# Patient Record
Sex: FEMALE | Race: WHITE | NOT HISPANIC OR LATINO | URBAN - METROPOLITAN AREA
[De-identification: names, ages, dates, MRNs, and addresses within clinical notes are randomized per-mention and may not be internally consistent; named-entity substitution may affect disease eponyms.]

---

## 2018-07-10 ENCOUNTER — OFFICE (OUTPATIENT)
Dept: URBAN - METROPOLITAN AREA CLINIC 75 | Facility: CLINIC | Age: 43
End: 2018-07-10
Payer: COMMERCIAL

## 2018-07-10 VITALS
WEIGHT: 267 LBS | HEART RATE: 107 BPM | HEIGHT: 64 IN | DIASTOLIC BLOOD PRESSURE: 80 MMHG | SYSTOLIC BLOOD PRESSURE: 136 MMHG

## 2018-07-10 VITALS
OXYGEN SATURATION: 99 % | HEART RATE: 82 BPM | SYSTOLIC BLOOD PRESSURE: 148 MMHG | HEART RATE: 98 BPM | HEART RATE: 86 BPM | TEMPERATURE: 98.6 F | SYSTOLIC BLOOD PRESSURE: 153 MMHG | DIASTOLIC BLOOD PRESSURE: 68 MMHG | HEART RATE: 77 BPM | SYSTOLIC BLOOD PRESSURE: 129 MMHG | RESPIRATION RATE: 16 BRPM | HEART RATE: 87 BPM | WEIGHT: 267 LBS | DIASTOLIC BLOOD PRESSURE: 62 MMHG | SYSTOLIC BLOOD PRESSURE: 155 MMHG | OXYGEN SATURATION: 100 % | RESPIRATION RATE: 14 BRPM | DIASTOLIC BLOOD PRESSURE: 81 MMHG | DIASTOLIC BLOOD PRESSURE: 95 MMHG | HEART RATE: 95 BPM | OXYGEN SATURATION: 96 % | RESPIRATION RATE: 18 BRPM | SYSTOLIC BLOOD PRESSURE: 144 MMHG | HEART RATE: 93 BPM | RESPIRATION RATE: 13 BRPM | DIASTOLIC BLOOD PRESSURE: 79 MMHG | SYSTOLIC BLOOD PRESSURE: 137 MMHG | HEART RATE: 96 BPM | HEIGHT: 64 IN | DIASTOLIC BLOOD PRESSURE: 80 MMHG

## 2018-07-10 DIAGNOSIS — Z87.11 PERSONAL HISTORY OF PEPTIC ULCER DISEASE: ICD-10-CM

## 2018-07-10 DIAGNOSIS — K59.00 CONSTIPATION, UNSPECIFIED: ICD-10-CM

## 2018-07-10 DIAGNOSIS — R10.13 EPIGASTRIC PAIN: ICD-10-CM

## 2018-07-10 DIAGNOSIS — R85.5 ABNORMAL MICROBIOLOGICAL FINDINGS IN SPECIMENS FROM DIGESTIV: ICD-10-CM

## 2018-07-10 DIAGNOSIS — K92.1 MELENA: ICD-10-CM

## 2018-07-10 DIAGNOSIS — Z86.010 PERSONAL HISTORY OF COLONIC POLYPS: ICD-10-CM

## 2018-07-10 PROCEDURE — 99205 OFFICE O/P NEW HI 60 MIN: CPT | Performed by: INTERNAL MEDICINE

## 2018-07-10 RX ORDER — LUBIPROSTONE 24 UG/1
48 CAPSULE, GELATIN COATED ORAL
Qty: 60 | Refills: 2 | Status: ACTIVE
Start: 2018-07-10

## 2018-07-10 RX ORDER — PANTOPRAZOLE SODIUM 40 MG/1
80 TABLET, DELAYED RELEASE ORAL
Qty: 60 | Refills: 2 | Status: ACTIVE

## 2018-07-13 ENCOUNTER — AMBULATORY SURGICAL CENTER (OUTPATIENT)
Dept: URBAN - METROPOLITAN AREA SURGERY 17 | Facility: SURGERY | Age: 43
End: 2018-07-13
Payer: COMMERCIAL

## 2018-07-13 ENCOUNTER — OFFICE (OUTPATIENT)
Dept: URBAN - METROPOLITAN AREA PATHOLOGY 4 | Facility: PATHOLOGY | Age: 43
End: 2018-07-13
Payer: COMMERCIAL

## 2018-07-13 DIAGNOSIS — K31.89 OTHER DISEASES OF STOMACH AND DUODENUM: ICD-10-CM

## 2018-07-13 DIAGNOSIS — K29.70 GASTRITIS, UNSPECIFIED, WITHOUT BLEEDING: ICD-10-CM

## 2018-07-13 DIAGNOSIS — K29.80 DUODENITIS WITHOUT BLEEDING: ICD-10-CM

## 2018-07-13 DIAGNOSIS — R10.13 EPIGASTRIC PAIN: ICD-10-CM

## 2018-07-13 DIAGNOSIS — R85.5 ABNORMAL MICROBIOLOGICAL FINDINGS IN SPECIMENS FROM DIGESTIV: ICD-10-CM

## 2018-07-13 DIAGNOSIS — R93.3 ABNORMAL FINDINGS ON DIAGNOSTIC IMAGING OF OTHER PARTS OF DI: ICD-10-CM

## 2018-07-13 DIAGNOSIS — T18.128A FOOD IN ESOPHAGUS CAUSING OTHER INJURY, INITIAL ENCOUNTER: ICD-10-CM

## 2018-07-13 DIAGNOSIS — Z87.11 PERSONAL HISTORY OF PEPTIC ULCER DISEASE: ICD-10-CM

## 2018-07-13 LAB
GI HISTOLOGY: A. UNSPECIFIED: (no result)
GI HISTOLOGY: B. SELECT: (no result)
GI HISTOLOGY: PDF REPORT: (no result)

## 2018-07-13 PROCEDURE — 43239 EGD BIOPSY SINGLE/MULTIPLE: CPT | Performed by: INTERNAL MEDICINE

## 2018-07-13 PROCEDURE — 88305 TISSUE EXAM BY PATHOLOGIST: CPT | Performed by: INTERNAL MEDICINE

## 2018-07-13 PROCEDURE — 88342 IMHCHEM/IMCYTCHM 1ST ANTB: CPT | Performed by: INTERNAL MEDICINE

## 2018-07-13 RX ADMIN — PROPOFOL 100 MG: 10 INJECTION, EMULSION INTRAVENOUS at 07:45

## 2018-07-13 RX ADMIN — PROPOFOL 50 MG: 10 INJECTION, EMULSION INTRAVENOUS at 07:50

## 2018-07-13 RX ADMIN — PROPOFOL 50 MG: 10 INJECTION, EMULSION INTRAVENOUS at 07:46

## 2018-08-17 PROBLEM — T18.2XXA FOREIGN BODY IN STOMACH, INITIAL ENCOUNTER: Status: ACTIVE | Noted: 2018-07-13

## 2020-02-09 ENCOUNTER — HOSPITAL ENCOUNTER (EMERGENCY)
Facility: HOSPITAL | Age: 45
Discharge: PSYCHIATRIC OR SUBSTANCE USE FACILITY | End: 2020-02-10
Attending: EMERGENCY MEDICINE | Admitting: EMERGENCY MEDICINE

## 2020-02-09 DIAGNOSIS — F33.1 MODERATE EPISODE OF RECURRENT MAJOR DEPRESSIVE DISORDER (HCC): Primary | ICD-10-CM

## 2020-02-09 DIAGNOSIS — R45.851 SUICIDAL THOUGHTS: ICD-10-CM

## 2020-02-09 LAB
AMPHET+METHAMPHET UR QL: NEGATIVE
BARBITURATES UR QL SCN: NEGATIVE
BENZODIAZ UR QL SCN: POSITIVE
CANNABINOIDS SERPL QL: NEGATIVE
COCAINE UR QL: NEGATIVE
ETHANOL BLD-MCNC: <10 MG/DL (ref 0–10)
ETHANOL UR QL: <0.01 %
METHADONE UR QL SCN: NEGATIVE
OPIATES UR QL: NEGATIVE
OXYCODONE UR QL SCN: NEGATIVE

## 2020-02-09 PROCEDURE — 80307 DRUG TEST PRSMV CHEM ANLYZR: CPT

## 2020-02-09 PROCEDURE — 99284 EMERGENCY DEPT VISIT MOD MDM: CPT

## 2020-02-09 RX ORDER — FAMOTIDINE 40 MG/1
40 TABLET, FILM COATED ORAL NIGHTLY
COMMUNITY

## 2020-02-09 RX ORDER — HYDROCODONE BITARTRATE AND ACETAMINOPHEN 7.5; 325 MG/1; MG/1
1 TABLET ORAL EVERY 8 HOURS PRN
COMMUNITY
End: 2020-02-13 | Stop reason: HOSPADM

## 2020-02-09 RX ORDER — ALPRAZOLAM 0.5 MG/1
0.5 TABLET ORAL 3 TIMES DAILY PRN
COMMUNITY
End: 2020-02-13 | Stop reason: HOSPADM

## 2020-02-09 RX ORDER — BACLOFEN 10 MG/1
10 TABLET ORAL 3 TIMES DAILY
Status: ON HOLD | COMMUNITY
End: 2020-02-13 | Stop reason: SDUPTHER

## 2020-02-09 RX ORDER — LISINOPRIL AND HYDROCHLOROTHIAZIDE 25; 20 MG/1; MG/1
1 TABLET ORAL DAILY
Status: ON HOLD | COMMUNITY
End: 2020-02-13 | Stop reason: SDUPTHER

## 2020-02-09 RX ORDER — FLUOXETINE HYDROCHLORIDE 60 MG/1
60 TABLET, FILM COATED ORAL; ORAL DAILY
COMMUNITY
End: 2020-02-13 | Stop reason: HOSPADM

## 2020-02-10 ENCOUNTER — HOSPITAL ENCOUNTER (INPATIENT)
Facility: HOSPITAL | Age: 45
LOS: 3 days | Discharge: HOME | End: 2020-02-13
Attending: SPECIALIST | Admitting: SPECIALIST

## 2020-02-10 VITALS
SYSTOLIC BLOOD PRESSURE: 144 MMHG | DIASTOLIC BLOOD PRESSURE: 82 MMHG | RESPIRATION RATE: 16 BRPM | OXYGEN SATURATION: 98 % | WEIGHT: 252.6 LBS | HEIGHT: 64 IN | BODY MASS INDEX: 43.13 KG/M2 | HEART RATE: 74 BPM | TEMPERATURE: 98.7 F

## 2020-02-10 PROBLEM — R45.851 SUICIDAL IDEATION: Status: ACTIVE | Noted: 2020-02-10

## 2020-02-10 PROBLEM — G89.29 CHRONIC BACK PAIN: Status: ACTIVE | Noted: 2020-02-10

## 2020-02-10 PROBLEM — C53.9 CERVICAL CANCER (HCC): Status: ACTIVE | Noted: 2020-02-10

## 2020-02-10 PROBLEM — F32.A ANXIETY AND DEPRESSION: Status: ACTIVE | Noted: 2020-02-10

## 2020-02-10 PROBLEM — M54.9 CHRONIC BACK PAIN: Status: ACTIVE | Noted: 2020-02-10

## 2020-02-10 PROBLEM — K21.9 GERD WITHOUT ESOPHAGITIS: Status: ACTIVE | Noted: 2020-02-10

## 2020-02-10 PROBLEM — F41.9 ANXIETY AND DEPRESSION: Status: ACTIVE | Noted: 2020-02-10

## 2020-02-10 PROCEDURE — 90791 PSYCH DIAGNOSTIC EVALUATION: CPT

## 2020-02-10 RX ORDER — PRAZOSIN HYDROCHLORIDE 2 MG/1
4 CAPSULE ORAL NIGHTLY
Status: DISCONTINUED | OUTPATIENT
Start: 2020-02-10 | End: 2020-02-13 | Stop reason: HOSPADM

## 2020-02-10 RX ORDER — ALUMINA, MAGNESIA, AND SIMETHICONE 2400; 2400; 240 MG/30ML; MG/30ML; MG/30ML
15 SUSPENSION ORAL EVERY 6 HOURS PRN
Status: DISCONTINUED | OUTPATIENT
Start: 2020-02-10 | End: 2020-02-13 | Stop reason: HOSPADM

## 2020-02-10 RX ORDER — FLUOXETINE HYDROCHLORIDE 20 MG/1
60 CAPSULE ORAL DAILY
Status: DISCONTINUED | OUTPATIENT
Start: 2020-02-10 | End: 2020-02-11

## 2020-02-10 RX ORDER — ACETAMINOPHEN 325 MG/1
650 TABLET ORAL EVERY 4 HOURS PRN
Status: DISCONTINUED | OUTPATIENT
Start: 2020-02-10 | End: 2020-02-13 | Stop reason: HOSPADM

## 2020-02-10 RX ORDER — ALPRAZOLAM 0.5 MG/1
0.5 TABLET ORAL 2 TIMES DAILY
Status: DISCONTINUED | OUTPATIENT
Start: 2020-02-10 | End: 2020-02-13 | Stop reason: HOSPADM

## 2020-02-10 RX ORDER — BACLOFEN 10 MG/1
10 TABLET ORAL 3 TIMES DAILY
Status: DISCONTINUED | OUTPATIENT
Start: 2020-02-10 | End: 2020-02-13 | Stop reason: HOSPADM

## 2020-02-10 RX ORDER — FAMOTIDINE 10 MG/ML
40 INJECTION, SOLUTION INTRAVENOUS NIGHTLY
Status: DISCONTINUED | OUTPATIENT
Start: 2020-02-10 | End: 2020-02-10

## 2020-02-10 RX ORDER — FAMOTIDINE 40 MG/1
40 TABLET, FILM COATED ORAL NIGHTLY
Status: DISCONTINUED | OUTPATIENT
Start: 2020-02-10 | End: 2020-02-13 | Stop reason: HOSPADM

## 2020-02-10 RX ORDER — HYDROCODONE BITARTRATE AND ACETAMINOPHEN 7.5; 325 MG/1; MG/1
1 TABLET ORAL 3 TIMES DAILY
Status: DISCONTINUED | OUTPATIENT
Start: 2020-02-10 | End: 2020-02-13 | Stop reason: HOSPADM

## 2020-02-10 RX ORDER — ALPRAZOLAM 1 MG/1
1 TABLET ORAL NIGHTLY
Status: DISCONTINUED | OUTPATIENT
Start: 2020-02-10 | End: 2020-02-13 | Stop reason: HOSPADM

## 2020-02-10 RX ORDER — ONDANSETRON 4 MG/1
4 TABLET, FILM COATED ORAL EVERY 8 HOURS PRN
Status: DISCONTINUED | OUTPATIENT
Start: 2020-02-10 | End: 2020-02-13 | Stop reason: HOSPADM

## 2020-02-10 RX ADMIN — LISINOPRIL: 20 TABLET ORAL at 11:01

## 2020-02-10 RX ADMIN — FLUOXETINE HYDROCHLORIDE 60 MG: 20 CAPSULE ORAL at 11:01

## 2020-02-10 RX ADMIN — ONDANSETRON HYDROCHLORIDE 4 MG: 4 TABLET, FILM COATED ORAL at 16:18

## 2020-02-10 RX ADMIN — ALPRAZOLAM 0.5 MG: 0.5 TABLET ORAL at 11:00

## 2020-02-10 RX ADMIN — HYDROCODONE BITARTRATE AND ACETAMINOPHEN 1 TABLET: 7.5; 325 TABLET ORAL at 16:17

## 2020-02-10 RX ADMIN — HYDROCODONE BITARTRATE AND ACETAMINOPHEN 1 TABLET: 7.5; 325 TABLET ORAL at 11:00

## 2020-02-10 RX ADMIN — PRAZOSIN HYDROCHLORIDE 4 MG: 2 CAPSULE ORAL at 21:47

## 2020-02-10 RX ADMIN — ALPRAZOLAM 0.5 MG: 0.5 TABLET ORAL at 16:17

## 2020-02-10 RX ADMIN — BACLOFEN 10 MG: 10 TABLET ORAL at 21:44

## 2020-02-10 RX ADMIN — HYDROCODONE BITARTRATE AND ACETAMINOPHEN 1 TABLET: 7.5; 325 TABLET ORAL at 21:43

## 2020-02-10 RX ADMIN — BACLOFEN 10 MG: 10 TABLET ORAL at 11:00

## 2020-02-10 RX ADMIN — ALPRAZOLAM 1 MG: 1 TABLET ORAL at 21:43

## 2020-02-10 RX ADMIN — BACLOFEN 10 MG: 10 TABLET ORAL at 16:17

## 2020-02-10 NOTE — ED NOTES
Pt being transported to [facility] via security and house float     Victoriano Arana, RN  02/10/20 0986

## 2020-02-10 NOTE — PLAN OF CARE
Problem: Patient Care Overview  Goal: Individualization and Mutuality  Outcome: Ongoing (interventions implemented as appropriate)  Flowsheets (Taken 2/10/2020 1039)  Patient Personal Strengths: independent living skills;expressive of needs;motivated for treatment;resourceful     Problem: Patient Care Overview  Goal: Interprofessional Rounds/Family Conf  Outcome: Ongoing (interventions implemented as appropriate)  Flowsheets (Taken 2/10/2020 1039)  Participants: ;nursing;psychiatrist;social work;pharmacy  Summary: Treatment team met to discuss plan of care.  Plan for individual session for patient and to offer  services.   to assess discharge needs.     Problem: Mood Impairment (Anxiety Signs/Symptoms) (Adult)  Goal: Improved Mood Symptoms (Anxiety Signs/Symptoms)  Outcome: Ongoing (interventions implemented as appropriate)  Flowsheets (Taken 2/10/2020 1039)  Improved Mood Symptoms Action Step/Short Term Goal (STG) Established: 02/10/20  Improved Mood Symptoms Time Frame for Action Step (STG): 4 days  Improved Mood Symptoms Action Step (STG) Outcome: making progress toward outcome     Problem: Activity/Energy/Vigor Impairment (Anxiety Signs/Symptoms) (Adult)  Goal: Improved Energy/Vigor (Anxiety Signs/Symptoms)  Outcome: Ongoing (interventions implemented as appropriate)  Flowsheets (Taken 2/10/2020 1039)  Improved Energy/Vigor Action Step/Short Term Goal (STG) Established: 02/10/20  Improved Energy/Vigor Time Frame for Action Step (STG): 4 days  Improved Energy/Vigor Action Step (STG) Outcome: making progress toward outcome     Problem: Sleep Impairment (Anxiety Signs/Symptoms) (Adult)  Goal: Improved Sleep Hygiene (Anxiety Signs/Symptoms)  Outcome: Ongoing (interventions implemented as appropriate)  Flowsheets (Taken 2/10/2020 1039)  Improved Sleep Hygiene Action Step/Short Term Goal (STG) Established: 02/10/20  Improved Sleep Hygiene Time Frame for Action Step (STG): 4  days  Improved Sleep Hygiene Action Step (STG) Outcome: making progress toward outcome     Problem: Social/Occupational/Functional Impairment (Anxiety Signs/Symptoms) (Adult)  Goal: Improved Social/Occupational/Functional Skills (Anxiety Signs/Symptoms)  Outcome: Ongoing (interventions implemented as appropriate)  Flowsheets (Taken 2/10/2020 1039)  Improved Social/Occupational/Functional Skills Action Step/Short Term Goal (STG) Established: 02/10/20  Improved Social/Occupational/Functional Skills Time Frame for Action Step (STG): 4 days  Improved Social/Occupational/Functional Skills Action Step (STG) Outcome: making progress toward outcome     Problem: Suicidal Behavior (Adult)  Goal: Suicidal Behavior is Absent/Minimized/Managed  Outcome: Ongoing (interventions implemented as appropriate)  Flowsheets (Taken 2/10/2020 1039)  Action Step/Short Term Goal (STG) Established: 02/10/20  Suicidal Behavior Managed/Minimized Time Frame for Action Step (STG): 4 days  Suicidal Behavior Managed/Minimized Action Step (STG) Outcome: making progress toward outcome     Problem: Mood Impairment (Depressive Signs/Symptoms) (Pediatric)  Goal: Improved Mood Symptoms (Depressive Signs/Symptoms)  Outcome: Ongoing (interventions implemented as appropriate)  Flowsheets (Taken 2/10/2020 1057)  Improved Mood Symptoms Action Step/Short Term Goal (STG) Established: 2/10/2020  Improved Mood Symptoms Time Frame for Action Step (STG): 4 days  Improved Mood Symptoms Action Step (STG) Outcome: making progress toward outcome     Problem: Feelings of Worthlessness, Hopelessness, Excessive Guilt (Depressive Signs/Symptoms) (Pediatric)  Goal: Enhanced Self-Esteem/Confidence (Depressive Signs/Symptoms)  Outcome: Ongoing (interventions implemented as appropriate)  Flowsheets (Taken 2/10/2020 1057)  Enhanced Self-Esteem/Confidence Action Step/Short Term Goal (STG) Established: 2/10/2020  Enhanced Self-Esteem/Confidence Time Frame for Action Step (STG): 4  days  Enhanced Self-Esteem/Confidence Action Step (STG) Outcome: making progress toward outcome     Problem: Decreased Participation/Engagement (Depressive Signs/Symptoms) (Pediatric)  Goal: Increased Participation/Engagement (Depressive Signs/Symptoms)  Outcome: Ongoing (interventions implemented as appropriate)  Flowsheets (Taken 2/10/2020 1057)  Increased Participation/Engagement Action Step/Short Term Goal (STG) Established: 2/10/2020  Increased Participation/Engagement Time Frame for Action Step (STG): 4 days  Increased Participation/Engagement Action Step (STG) Outcome: making progress toward outcome     Problem: Weight/Appetite Change (Depressive Signs/Symptoms) (Pediatric)  Goal: Nutrition/Weight Optimized (Depressive Signs/Symptoms)  Outcome: Ongoing (interventions implemented as appropriate)  Flowsheets (Taken 2/10/2020 1057)  Optimized Nutrition/Weight Action Step/Short Term Goal (STG) Established: 2/10/2020  Optimized Nutrition/Weight Time Frame for Action Step (STG): 4 days  Optimized Nutrition/Weight Action Step (STG) Outcome: making progress toward outcome     Problem: Social/Academic or Functional Impairment (Depressive Signs/Symptoms) (Pediatric)  Goal: Improved Social/Academic/Functional Skills (Depressive Signs/Symptoms)  Outcome: Ongoing (interventions implemented as appropriate)  Flowsheets (Taken 2/10/2020 1057)  Improved Social/Academic/Functional Skills Action Step/Short Term Goal (STG) Established: 2/10/2020  Improved Social/Academic/Functional Skills Time Frame for Action Step (STG): 4 days  Improved Social/Academic/Functional Skills Action Step (STG) Outcome: making progress toward outcome     Problem: Energy/Vigor Impairment (Depressive Signs/Symptoms) (Pediatric)  Goal: Improved Activity/Energy/Vigor (Depressive Signs/Symptoms)  Outcome: Ongoing (interventions implemented as appropriate)  Flowsheets (Taken 2/10/2020 1057)  Improved Activity/Energy/Vigor Action Step/Short Term Goal (STG)  Established: 2/10/2020  Improved Energy/Vigor Time Frame for Action Step (STG): 4 days  Improved Activity/Energy/Vigor Action Step (STG) Outcome: making progress toward outcome

## 2020-02-10 NOTE — PROGRESS NOTES
Continued Stay Note  [facility]     Patient Name: Jesusita San  MRN: [id number]  Today's Date: 2/10/2020    Admit Date: 2/10/2020    Discharge Plan     Row Name 02/10/20 1656       Plan    Plan  Pt will return home. SW will explore outpt providers with pt.         Discharge Codes    No documentation.             FRANK Le

## 2020-02-10 NOTE — PLAN OF CARE
Problem: Patient Care Overview  Goal: Discharge Needs Assessment  Outcome: Ongoing (interventions implemented as appropriate)  Flowsheets  Taken 2/10/2020 4370  Outpatient/Agency/Support Group Needs: outpatient medication management;outpatient psychiatric care (specify);outpatient counseling  Discharge Coordination/Progress: Pt will return home. SW will explore outpt providers with pt.  Anticipated Discharge Disposition: home or self-care  Taken 2/10/2020 1656  Transportation Anticipated: family or friend will provide  Concerns to be Addressed: coping/stress;decision making;mental health;medication;suicidal  Patient/Family Anticipated Services at Transition: mental health services  Patient/Family Anticipates Transition to: home
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
## 2020-02-10 NOTE — ED PROVIDER NOTES
MD ATTESTATION NOTE    Patient is a 44 y.o. female with hx of depression and cervical cancer who presents to the ED with complaint of suicidal ideations that started two weeks ago and has progressively worsened. The patient reports she has recently had increased emotional stressors that have exacerbated her depression. The patient admits to occasional EtOH consumption. The patient denies drug or tobacco use.    Physical Exam:  Constitutional: awake, alert, oriented, no acute distress  Psychological: no depressed mood appreciated    Vital signs and nursing notes reviewed.    Workup reviewed. BAL is negative. UDS is positive for benzodiazepine. Plan is for ACCESS to evaluate the patient.    The RENETTA and I have discussed this patient's history, physical exam, and treatment plan.  I have reviewed the documentation and personally had a face to face interaction with the patient. I affirm the documentation and agree with the treatment and plan.  The attached note describes my personal findings.    Documentation assistance provided by samantha Salomon for Dr. Blayne Cowan MD.  Information recorded by the scribe was done at my direction and has been verified and validated by me.       Rossy Salomon  02/09/20 0878       Blayne Cowan MD  02/10/20 2688
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
## 2020-02-10 NOTE — PLAN OF CARE
Problem: Patient Care Overview  Goal: Individualization and Mutuality  Outcome: Ongoing (interventions implemented as appropriate)  Flowsheets (Taken 2/10/2020 1039)  Patient Personal Strengths: independent living skills; expressive of needs; motivated for treatment; resourceful     Problem: Patient Care Overview  Goal: Interprofessional Rounds/Family Conf  Outcome: Ongoing (interventions implemented as appropriate)  Flowsheets (Taken 2/10/2020 1039)  Participants: ;nursing;psychiatrist;social work;pharmacy  Summary: Treatment team met to discuss plan of care.  Plan for individual session for patient and to offer  services.   to assess discharge needs.     Problem: Mood Impairment (Anxiety Signs/Symptoms) (Adult)  Goal: Improved Mood Symptoms (Anxiety Signs/Symptoms)  Outcome: Ongoing (interventions implemented as appropriate)  Flowsheets (Taken 2/10/2020 1039)  Improved Mood Symptoms Action Step/Short Term Goal (STG) Established: 02/10/20  Improved Mood Symptoms Time Frame for Action Step (STG): 4 days  Improved Mood Symptoms Action Step (STG) Outcome: making progress toward outcome     Problem: Activity/Energy/Vigor Impairment (Anxiety Signs/Symptoms) (Adult)  Goal: Improved Energy/Vigor (Anxiety Signs/Symptoms)  Outcome: Ongoing (interventions implemented as appropriate)  Flowsheets (Taken 2/10/2020 1039)  Improved Energy/Vigor Action Step/Short Term Goal (STG) Established: 02/10/20  Improved Energy/Vigor Time Frame for Action Step (STG): 4 days  Improved Energy/Vigor Action Step (STG) Outcome: making progress toward outcome     Problem: Sleep Impairment (Anxiety Signs/Symptoms) (Adult)  Goal: Improved Sleep Hygiene (Anxiety Signs/Symptoms)  Outcome: Ongoing (interventions implemented as appropriate)  Flowsheets (Taken 2/10/2020 1039)  Improved Sleep Hygiene Action Step/Short Term Goal (STG) Established: 02/10/20  Improved Sleep Hygiene Time Frame for Action Step (STG): 4  days  Improved Sleep Hygiene Action Step (STG) Outcome: making progress toward outcome     Problem: Social/Occupational/Functional Impairment (Anxiety Signs/Symptoms) (Adult)  Goal: Improved Social/Occupational/Functional Skills (Anxiety Signs/Symptoms)  Outcome: Ongoing (interventions implemented as appropriate)  Flowsheets (Taken 2/10/2020 1039)  Improved Social/Occupational/Functional Skills Action Step/Short Term Goal (STG) Established: 02/10/20  Improved Social/Occupational/Functional Skills Time Frame for Action Step (STG): 4 days  Improved Social/Occupational/Functional Skills Action Step (STG) Outcome: making progress toward outcome     Problem: Suicidal Behavior (Adult)  Goal: Suicidal Behavior is Absent/Minimized/Managed  Outcome: Ongoing (interventions implemented as appropriate)  Flowsheets (Taken 2/10/2020 1039)  Action Step/Short Term Goal (STG) Established: 02/10/20  Suicidal Behavior Managed/Minimized Time Frame for Action Step (STG): 4 days  Suicidal Behavior Managed/Minimized Action Step (STG) Outcome: making progress toward outcome    Patient/Guardian Signature: __________________________________            Psychiatrist Signature: ______________________________________             Therapist Signature: ________________________________________         Nurse Signature: ___________________________________________          Staff Signature: ____________________________________________            Staff Signature: ____________________________________________          Staff Signature: ____________________________________________          Staff Signature:

## 2020-02-11 PROCEDURE — 63710000001 ONDANSETRON PER 8 MG: Performed by: SPECIALIST

## 2020-02-11 RX ORDER — FLUOXETINE HYDROCHLORIDE 20 MG/1
80 CAPSULE ORAL DAILY
Status: DISCONTINUED | OUTPATIENT
Start: 2020-02-12 | End: 2020-02-13 | Stop reason: HOSPADM

## 2020-02-11 RX ORDER — ARIPIPRAZOLE 2 MG/1
2 TABLET ORAL DAILY
Status: DISCONTINUED | OUTPATIENT
Start: 2020-02-11 | End: 2020-02-13 | Stop reason: HOSPADM

## 2020-02-11 RX ADMIN — ALPRAZOLAM 0.5 MG: 0.5 TABLET ORAL at 09:44

## 2020-02-11 RX ADMIN — LISINOPRIL: 20 TABLET ORAL at 09:43

## 2020-02-11 RX ADMIN — HYDROCODONE BITARTRATE AND ACETAMINOPHEN 1 TABLET: 7.5; 325 TABLET ORAL at 22:52

## 2020-02-11 RX ADMIN — ARIPIPRAZOLE 2 MG: 2 TABLET ORAL at 15:33

## 2020-02-11 RX ADMIN — FAMOTIDINE 40 MG: 40 TABLET, FILM COATED ORAL at 22:56

## 2020-02-11 RX ADMIN — ALPRAZOLAM 1 MG: 1 TABLET ORAL at 22:51

## 2020-02-11 RX ADMIN — ONDANSETRON HYDROCHLORIDE 4 MG: 4 TABLET, FILM COATED ORAL at 22:52

## 2020-02-11 RX ADMIN — BACLOFEN 10 MG: 10 TABLET ORAL at 09:44

## 2020-02-11 RX ADMIN — FLUOXETINE HYDROCHLORIDE 60 MG: 20 CAPSULE ORAL at 09:44

## 2020-02-11 RX ADMIN — HYDROCODONE BITARTRATE AND ACETAMINOPHEN 1 TABLET: 7.5; 325 TABLET ORAL at 09:44

## 2020-02-11 RX ADMIN — BACLOFEN 10 MG: 10 TABLET ORAL at 22:52

## 2020-02-11 RX ADMIN — PRAZOSIN HYDROCHLORIDE 4 MG: 2 CAPSULE ORAL at 22:52

## 2020-02-11 RX ADMIN — ONDANSETRON HYDROCHLORIDE 4 MG: 4 TABLET, FILM COATED ORAL at 11:26

## 2020-02-11 RX ADMIN — BACLOFEN 10 MG: 10 TABLET ORAL at 15:27

## 2020-02-11 RX ADMIN — HYDROCODONE BITARTRATE AND ACETAMINOPHEN 1 TABLET: 7.5; 325 TABLET ORAL at 15:33

## 2020-02-11 RX ADMIN — ALPRAZOLAM 0.5 MG: 0.5 TABLET ORAL at 15:33

## 2020-02-11 NOTE — PROGRESS NOTES
The patient is pleasant and cooperative during interview today.  She is agreeable to a trial of Abilify along with optimization of her Prozac dose.  We continue current treatment and await word regarding a family therapy session

## 2020-02-11 NOTE — PLAN OF CARE
Pt denying SI/HI/AVH and continues to contract for safety. Pt guarded with flat affect. Pt did spend time in the dayroom with peers. Pt pleasant and compliant with meds. Pt slept most of the evening.     Problem: Patient Care Overview  Goal: Plan of Care Review  Outcome: Ongoing (interventions implemented as appropriate)  Flowsheets (Taken 2/11/2020 0444)  Progress: no change  Plan of Care Reviewed With: patient  Patient Agreement with Plan of Care: agrees  Goal: Individualization and Mutuality  Outcome: Ongoing (interventions implemented as appropriate)  Goal: Discharge Needs Assessment  Outcome: Ongoing (interventions implemented as appropriate)  Goal: Interprofessional Rounds/Family Conf  Outcome: Ongoing (interventions implemented as appropriate)     Problem: Overarching Goals (Adult)  Goal: Adheres to Safety Considerations for Self and Others  Outcome: Ongoing (interventions implemented as appropriate)  Flowsheets (Taken 2/11/2020 0444)  Adheres to Safety Considerations for Self and Others: making progress toward outcome  Goal: Optimized Coping Skills in Response to Life Stressors  Outcome: Ongoing (interventions implemented as appropriate)  Flowsheets (Taken 2/11/2020 0444)  Optimized Coping Skills in Response to Life Stressors: making progress toward outcome  Goal: Develops/Participates in Therapeutic [place] to Support Successful Transition  Outcome: Ongoing (interventions implemented as appropriate)  Flowsheets (Taken 2/11/2020 0444)  Develops/Participates in Therapeutic [place] to Support Successful Transition: making progress toward outcome     Problem: Mood Impairment (Anxiety Signs/Symptoms) (Adult)  Goal: Improved Mood Symptoms (Anxiety Signs/Symptoms)  Outcome: Ongoing (interventions implemented as appropriate)     Problem: Activity/Energy/Vigor Impairment (Anxiety Signs/Symptoms) (Adult)  Goal: Improved Energy/Vigor (Anxiety Signs/Symptoms)  Outcome: Ongoing (interventions implemented as  appropriate)     Problem: Sleep Impairment (Anxiety Signs/Symptoms) (Adult)  Goal: Improved Sleep Hygiene (Anxiety Signs/Symptoms)  Outcome: Ongoing (interventions implemented as appropriate)     Problem: Social/Occupational/Functional Impairment (Anxiety Signs/Symptoms) (Adult)  Goal: Improved Social/Occupational/Functional Skills (Anxiety Signs/Symptoms)  Outcome: Ongoing (interventions implemented as appropriate)     Problem: Suicidal Behavior (Adult)  Goal: Suicidal Behavior is Absent/Minimized/Managed  Outcome: Ongoing (interventions implemented as appropriate)     Problem: Mood Impairment (Depressive Signs/Symptoms) (Pediatric)  Goal: Improved Mood Symptoms (Depressive Signs/Symptoms)  Outcome: Ongoing (interventions implemented as appropriate)     Problem: Feelings of Worthlessness, Hopelessness, Excessive Guilt (Depressive Signs/Symptoms) (Pediatric)  Goal: Enhanced Self-Esteem/Confidence (Depressive Signs/Symptoms)  Outcome: Ongoing (interventions implemented as appropriate)     Problem: Decreased Participation/Engagement (Depressive Signs/Symptoms) (Pediatric)  Goal: Increased Participation/Engagement (Depressive Signs/Symptoms)  Outcome: Ongoing (interventions implemented as appropriate)     Problem: Weight/Appetite Change (Depressive Signs/Symptoms) (Pediatric)  Goal: Nutrition/Weight Optimized (Depressive Signs/Symptoms)  Outcome: Ongoing (interventions implemented as appropriate)     Problem: Social/Academic or Functional Impairment (Depressive Signs/Symptoms) (Pediatric)  Goal: Improved Social/Academic/Functional Skills (Depressive Signs/Symptoms)  Outcome: Ongoing (interventions implemented as appropriate)     Problem: Energy/Vigor Impairment (Depressive Signs/Symptoms) (Pediatric)  Goal: Improved Activity/Energy/Vigor (Depressive Signs/Symptoms)  Outcome: Ongoing (interventions implemented as appropriate)

## 2020-02-12 RX ADMIN — ALPRAZOLAM 1 MG: 1 TABLET ORAL at 21:12

## 2020-02-12 RX ADMIN — FAMOTIDINE 40 MG: 40 TABLET, FILM COATED ORAL at 21:12

## 2020-02-12 RX ADMIN — BACLOFEN 10 MG: 10 TABLET ORAL at 15:12

## 2020-02-12 RX ADMIN — ALPRAZOLAM 0.5 MG: 0.5 TABLET ORAL at 15:07

## 2020-02-12 RX ADMIN — HYDROCODONE BITARTRATE AND ACETAMINOPHEN 1 TABLET: 7.5; 325 TABLET ORAL at 15:12

## 2020-02-12 RX ADMIN — ALPRAZOLAM 0.5 MG: 0.5 TABLET ORAL at 09:01

## 2020-02-12 RX ADMIN — HYDROCODONE BITARTRATE AND ACETAMINOPHEN 1 TABLET: 7.5; 325 TABLET ORAL at 09:00

## 2020-02-12 RX ADMIN — BACLOFEN 10 MG: 10 TABLET ORAL at 09:04

## 2020-02-12 RX ADMIN — FLUOXETINE HYDROCHLORIDE 80 MG: 20 CAPSULE ORAL at 09:00

## 2020-02-12 RX ADMIN — HYDROCODONE BITARTRATE AND ACETAMINOPHEN 1 TABLET: 7.5; 325 TABLET ORAL at 21:12

## 2020-02-12 RX ADMIN — LISINOPRIL: 20 TABLET ORAL at 09:04

## 2020-02-12 RX ADMIN — BACLOFEN 10 MG: 10 TABLET ORAL at 21:12

## 2020-02-12 RX ADMIN — ARIPIPRAZOLE 2 MG: 2 TABLET ORAL at 09:00

## 2020-02-12 RX ADMIN — PRAZOSIN HYDROCHLORIDE 4 MG: 2 CAPSULE ORAL at 21:12

## 2020-02-12 NOTE — PROGRESS NOTES
Continued Stay Note  [facility]     Patient Name: Jesusita San  MRN: [id number]  Today's Date: 2/12/2020    Admit Date: 2/10/2020    Discharge Plan     Row Name 02/12/20 1557       Plan    Plan Comments  SW met w/pt to discuss follow-up care.  Pt stated that she would be open to returning to [facility] and would like to have a .  SW adv that she would provide pt w/some resources for financial assistance w/rent & utilities.        Discharge Codes    No documentation.             MIRELLA Matthews
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
## 2020-02-12 NOTE — PLAN OF CARE
Problem: Patient Care Overview  Goal: Plan of Care Review  Outcome: Ongoing (interventions implemented as appropriate)  Flowsheets (Taken 2/12/2020 1561)  Progress: improving  Plan of Care Reviewed With: patient  Patient Agreement with Plan of Care: agrees  Note:   Pt was pleasant, cooperative, compliant with medications and care. Pt denied SI, HI, and hallucinations. Pt voiced anxiety 9/10, depression 9/10. Pt slept all night, will continue to monitor behaviors, provide support and safe environment.

## 2020-02-13 VITALS
DIASTOLIC BLOOD PRESSURE: 72 MMHG | HEART RATE: 65 BPM | TEMPERATURE: 97.9 F | SYSTOLIC BLOOD PRESSURE: 112 MMHG | RESPIRATION RATE: 18 BRPM | OXYGEN SATURATION: 98 %

## 2020-02-13 LAB
CHOLEST SERPL-MCNC: 178 MG/DL (ref 0–200)
HDLC SERPL-MCNC: 47 MG/DL (ref 40–60)
LDLC SERPL CALC-MCNC: 102 MG/DL (ref 0–100)
LDLC/HDLC SERPL: 2.17 {RATIO}
TRIGL SERPL-MCNC: 146 MG/DL (ref 0–150)
VLDLC SERPL-MCNC: 29.2 MG/DL

## 2020-02-13 PROCEDURE — 80061 LIPID PANEL: CPT | Performed by: SPECIALIST

## 2020-02-13 RX ORDER — ALPRAZOLAM 1 MG/1
1 TABLET ORAL NIGHTLY
Qty: 5 TABLET | Refills: 0 | Status: ON HOLD | OUTPATIENT
Start: 2020-02-13 | End: 2020-09-25

## 2020-02-13 RX ORDER — BACLOFEN 10 MG/1
10 TABLET ORAL 3 TIMES DAILY
Qty: 15 TABLET | Refills: 0 | Status: SHIPPED | OUTPATIENT
Start: 2020-02-13

## 2020-02-13 RX ORDER — FLUOXETINE HYDROCHLORIDE 40 MG/1
40 CAPSULE ORAL DAILY
Qty: 60 CAPSULE | Refills: 1 | Status: SHIPPED | OUTPATIENT
Start: 2020-02-14 | End: 2020-03-15

## 2020-02-13 RX ORDER — HYDROCODONE BITARTRATE AND ACETAMINOPHEN 7.5; 325 MG/1; MG/1
1 TABLET ORAL 3 TIMES DAILY
Qty: 15 TABLET | Refills: 0 | Status: SHIPPED | OUTPATIENT
Start: 2020-02-13 | End: 2020-02-18

## 2020-02-13 RX ORDER — ALPRAZOLAM 0.5 MG/1
0.5 TABLET ORAL 2 TIMES DAILY
Qty: 10 TABLET | Refills: 0 | Status: SHIPPED | OUTPATIENT
Start: 2020-02-13 | End: 2020-03-15 | Stop reason: HOSPADM

## 2020-02-13 RX ORDER — LISINOPRIL AND HYDROCHLOROTHIAZIDE 25; 20 MG/1; MG/1
1 TABLET ORAL DAILY
Qty: 30 TABLET | Refills: 0 | Status: ON HOLD | OUTPATIENT
Start: 2020-02-13 | End: 2020-09-25

## 2020-02-13 RX ORDER — PRAZOSIN HYDROCHLORIDE 1 MG/1
4 CAPSULE ORAL NIGHTLY
Qty: 120 CAPSULE | Refills: 1 | Status: ON HOLD | OUTPATIENT
Start: 2020-02-13 | End: 2020-09-25

## 2020-02-13 RX ADMIN — BACLOFEN 10 MG: 10 TABLET ORAL at 08:39

## 2020-02-13 RX ADMIN — LISINOPRIL: 20 TABLET ORAL at 08:39

## 2020-02-13 RX ADMIN — ALPRAZOLAM 0.5 MG: 0.5 TABLET ORAL at 15:12

## 2020-02-13 RX ADMIN — HYDROCODONE BITARTRATE AND ACETAMINOPHEN 1 TABLET: 7.5; 325 TABLET ORAL at 08:38

## 2020-02-13 RX ADMIN — HYDROCODONE BITARTRATE AND ACETAMINOPHEN 1 TABLET: 7.5; 325 TABLET ORAL at 15:12

## 2020-02-13 RX ADMIN — ALPRAZOLAM 0.5 MG: 0.5 TABLET ORAL at 08:38

## 2020-02-13 RX ADMIN — FLUOXETINE HYDROCHLORIDE 80 MG: 20 CAPSULE ORAL at 08:38

## 2020-02-13 RX ADMIN — ARIPIPRAZOLE 2 MG: 2 TABLET ORAL at 08:38

## 2020-02-13 RX ADMIN — BACLOFEN 10 MG: 10 TABLET ORAL at 15:11
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
## 2020-02-13 NOTE — PLAN OF CARE
Problem: Patient Care Overview  Goal: Plan of Care Review  Outcome: Ongoing (interventions implemented as appropriate)  Flowsheets  Taken 2/13/2020 0523  Progress: improving  Taken 2/13/2020 0015  Plan of Care Reviewed With: patient  Patient Agreement with Plan of Care: agrees  Note:   Anxiety rated 4/10, depression 6/10, and no SI/HI. Cooperative and med compliant. Will continue to monitor and assess.    Goal: Individualization and Mutuality  Outcome: Ongoing (interventions implemented as appropriate)  Goal: Discharge Needs Assessment  Outcome: Ongoing (interventions implemented as appropriate)  Goal: Interprofessional Rounds/Family Conf  Outcome: Ongoing (interventions implemented as appropriate)     Problem: Overarching Goals (Adult)  Goal: Adheres to Safety Considerations for Self and Others  Outcome: Ongoing (interventions implemented as appropriate)  Flowsheets (Taken 2/13/2020 0523)  Adheres to Safety Considerations for Self and Others: making progress toward outcome  Goal: Optimized Coping Skills in Response to Life Stressors  Outcome: Ongoing (interventions implemented as appropriate)  Flowsheets (Taken 2/13/2020 0523)  Optimized Coping Skills in Response to Life Stressors: making progress toward outcome  Goal: Develops/Participates in Therapeutic [place] to Support Successful Transition  Outcome: Ongoing (interventions implemented as appropriate)  Flowsheets (Taken 2/13/2020 0523)  Develops/Participates in Therapeutic [place] to Support Successful Transition: making progress toward outcome     Problem: Mood Impairment (Anxiety Signs/Symptoms) (Adult)  Goal: Improved Mood Symptoms (Anxiety Signs/Symptoms)  Outcome: Ongoing (interventions implemented as appropriate)     Problem: Activity/Energy/Vigor Impairment (Anxiety Signs/Symptoms) (Adult)  Goal: Improved Energy/Vigor (Anxiety Signs/Symptoms)  Outcome: Ongoing (interventions implemented as appropriate)     Problem: Sleep Impairment (Anxiety  Signs/Symptoms) (Adult)  Goal: Improved Sleep Hygiene (Anxiety Signs/Symptoms)  Outcome: Ongoing (interventions implemented as appropriate)     Problem: Social/Occupational/Functional Impairment (Anxiety Signs/Symptoms) (Adult)  Goal: Improved Social/Occupational/Functional Skills (Anxiety Signs/Symptoms)  Outcome: Ongoing (interventions implemented as appropriate)  Flowsheets (Taken 2/13/2020 0523)  Improved Social/Occupational/Functional Skills Time Frame for Action Step (STG): 1 day  Improved Social/Occupational/Functional Skills Action Step (STG) Outcome: making progress toward outcome     Problem: Mood Impairment (Depressive Signs/Symptoms) (Pediatric)  Goal: Improved Mood Symptoms (Depressive Signs/Symptoms)  Outcome: Ongoing (interventions implemented as appropriate)  Flowsheets (Taken 2/13/2020 0523)  Improved Mood Symptoms Time Frame for Action Step (STG): 1 day  Improved Mood Symptoms Action Step (STG) Outcome: making progress toward outcome     Problem: Feelings of Worthlessness, Hopelessness, Excessive Guilt (Depressive Signs/Symptoms) (Pediatric)  Goal: Enhanced Self-Esteem/Confidence (Depressive Signs/Symptoms)  Outcome: Ongoing (interventions implemented as appropriate)     Problem: Decreased Participation/Engagement (Depressive Signs/Symptoms) (Pediatric)  Goal: Increased Participation/Engagement (Depressive Signs/Symptoms)  Outcome: Ongoing (interventions implemented as appropriate)  Flowsheets (Taken 2/13/2020 0523)  Increased Participation/Engagement Time Frame for Action Step (STG): 1 day  Increased Participation/Engagement Action Step (STG) Outcome: making progress toward outcome     Problem: Weight/Appetite Change (Depressive Signs/Symptoms) (Pediatric)  Goal: Nutrition/Weight Optimized (Depressive Signs/Symptoms)  Outcome: Ongoing (interventions implemented as appropriate)     Problem: Social/Academic or Functional Impairment (Depressive Signs/Symptoms) (Pediatric)  Goal: Improved  Social/Academic/Functional Skills (Depressive Signs/Symptoms)  Outcome: Ongoing (interventions implemented as appropriate)  Flowsheets (Taken 2/13/2020 3518)  Improved Social/Academic/Functional Skills Time Frame for Action Step (STG): 1 day  Improved Social/Academic/Functional Skills Action Step (STG) Outcome: making progress toward outcome     Problem: Energy/Vigor Impairment (Depressive Signs/Symptoms) (Pediatric)  Goal: Improved Activity/Energy/Vigor (Depressive Signs/Symptoms)  Outcome: Ongoing (interventions implemented as appropriate)  Flowsheets (Taken 2/13/2020 5103)  Improved Energy/Vigor Time Frame for Action Step (STG): 1 day  Improved Activity/Energy/Vigor Action Step (STG) Outcome: making progress toward outcome

## 2020-02-13 NOTE — DISCHARGE INSTRUCTIONS
[facility] w/ Dr. Donohue 2/18 at 4p   [address] [facility] [address]    Intake appoint at [facility]  [facility] 2/18 at 8:10a  [address]

## 2020-02-13 NOTE — PLAN OF CARE
"  Problem: Patient Care Overview  Goal: Plan of Care Review  Outcome: Ongoing (interventions implemented as appropriate)  Flowsheets  Taken 2/12/2020 1800  Progress: improving  Patient Agreement with Plan of Care: agrees  Outcome Summary: Patient has slept off and on during the shift, reporting that the Abilify is making her tired. She also reports feeling better with her depression and anxiety. When asked if she is suicidal patient reports \"not while I am in here.\" Will continue to monitor  Taken 2/12/2020 1512  Plan of Care Reviewed With: patient  Goal: Individualization and Mutuality  Outcome: Ongoing (interventions implemented as appropriate)  Goal: Discharge Needs Assessment  Outcome: Ongoing (interventions implemented as appropriate)  Goal: Interprofessional Rounds/Family Conf  Outcome: Ongoing (interventions implemented as appropriate)     Problem: Overarching Goals (Adult)  Goal: Adheres to Safety Considerations for Self and Others  Outcome: Ongoing (interventions implemented as appropriate)  Goal: Optimized Coping Skills in Response to Life Stressors  Outcome: Ongoing (interventions implemented as appropriate)  Goal: Develops/Participates in Therapeutic [place] to Support Successful Transition  Outcome: Ongoing (interventions implemented as appropriate)     Problem: Mood Impairment (Anxiety Signs/Symptoms) (Adult)  Goal: Improved Mood Symptoms (Anxiety Signs/Symptoms)  Outcome: Ongoing (interventions implemented as appropriate)     Problem: Activity/Energy/Vigor Impairment (Anxiety Signs/Symptoms) (Adult)  Goal: Improved Energy/Vigor (Anxiety Signs/Symptoms)  Outcome: Ongoing (interventions implemented as appropriate)     Problem: Sleep Impairment (Anxiety Signs/Symptoms) (Adult)  Goal: Improved Sleep Hygiene (Anxiety Signs/Symptoms)  Outcome: Ongoing (interventions implemented as appropriate)     Problem: Social/Occupational/Functional Impairment (Anxiety Signs/Symptoms) (Adult)  Goal: Improved " Social/Occupational/Functional Skills (Anxiety Signs/Symptoms)  Outcome: Ongoing (interventions implemented as appropriate)     Problem: Suicidal Behavior (Adult)  Goal: Suicidal Behavior is Absent/Minimized/Managed  Outcome: Ongoing (interventions implemented as appropriate)     Problem: Mood Impairment (Depressive Signs/Symptoms) (Pediatric)  Goal: Improved Mood Symptoms (Depressive Signs/Symptoms)  Outcome: Ongoing (interventions implemented as appropriate)     Problem: Feelings of Worthlessness, Hopelessness, Excessive Guilt (Depressive Signs/Symptoms) (Pediatric)  Goal: Enhanced Self-Esteem/Confidence (Depressive Signs/Symptoms)  Outcome: Ongoing (interventions implemented as appropriate)     Problem: Decreased Participation/Engagement (Depressive Signs/Symptoms) (Pediatric)  Goal: Increased Participation/Engagement (Depressive Signs/Symptoms)  Outcome: Ongoing (interventions implemented as appropriate)     Problem: Weight/Appetite Change (Depressive Signs/Symptoms) (Pediatric)  Goal: Nutrition/Weight Optimized (Depressive Signs/Symptoms)  Outcome: Ongoing (interventions implemented as appropriate)     Problem: Social/Academic or Functional Impairment (Depressive Signs/Symptoms) (Pediatric)  Goal: Improved Social/Academic/Functional Skills (Depressive Signs/Symptoms)  Outcome: Ongoing (interventions implemented as appropriate)     Problem: Energy/Vigor Impairment (Depressive Signs/Symptoms) (Pediatric)  Goal: Improved Activity/Energy/Vigor (Depressive Signs/Symptoms)  Outcome: Ongoing (interventions implemented as appropriate)

## 2020-02-13 NOTE — PROGRESS NOTES
I spoke with  Medicine regarding xanax Rx, pt does not have controlled substance contract with them.   Did request that pt be seem prior to refills  She has appt at  primary care  Tuesday at 4pm Dr Donohue   [address] [place] [address]    Pt was discharged from acute pain management on 2/3/20 and is transitioning to chronic pain management. She has her intake appointment as below   [facility]   February 18, 20 8:10am  [address] [facility]

## 2020-02-13 NOTE — PROGRESS NOTES
Continued Stay Note  [facility]     Patient Name: Jesusita San  MRN: [id number]  Today's Date: 2/13/2020    Admit Date: 2/10/2020    Discharge Plan     Row Name 02/13/20 1247       Plan    Final Discharge Disposition Code  01 - home or self-care    Final Note  Pt will be discharged per Dr. Landry's orders.  SW met w/pt to discuss follow-up care.  Pt has a scheduled appt at [facility] mental health therapy & case management [facility].  Pt did not have transportation home; SW completed a cab voucher for pt to be transported to [address] [place] [address], [address] w/no stops, no tip.        Discharge Codes    No documentation.       Expected Discharge Date and Time     Expected Discharge Date Expected Discharge Time    Feb 13, 2020             MIRELLA Matthews

## 2020-02-13 NOTE — PROGRESS NOTES
Continued Stay Note  [facility]     Patient Name: Jesusita San  MRN: [id number]  Today's Date: 2/13/2020    Admit Date: 2/10/2020    Discharge Plan     Row Name 02/13/20 1249       Plan    Plan Comments  SW gave pt resources for financial assistance w/rent & utilities. KHALIF faxed d/c summary to [phone].    Row Name 02/13/20 1247       Plan    Final Discharge Disposition Code  01 - home or self-care    Final Note  Pt will be discharged per Dr. Landry's orders.  KHALIF met w/pt to discuss follow-up care.  Pt has a scheduled appt at [facility] for mental health therapy & case management sv.  Pt did not have transportation home; KHALIF completed a cab voucher for pt to be transported to [address] [place] [address]. 1 w/no stops, no tip.        Discharge Codes    No documentation.       Expected Discharge Date and Time     Expected Discharge Date Expected Discharge Time    Feb 13, 2020             MIRELLA Matthews

## 2020-02-17 ENCOUNTER — TELEPHONE (OUTPATIENT)
Dept: PSYCHIATRY | Facility: HOSPITAL | Age: 45
End: 2020-02-17

## 2020-02-17 NOTE — TELEPHONE ENCOUNTER
"Patient reports she is doing \"okay I guess.\"  She states she has all her medications and understands her discharge instructions.  She states she has appointments with 3 providers tomorrow.  No further assistance requested at this time.  "

## 2020-03-05 ENCOUNTER — HOSPITAL ENCOUNTER (EMERGENCY)
Facility: HOSPITAL | Age: 45
Discharge: PSYCHIATRIC OR SUBSTANCE USE FACILITY | End: 2020-03-06
Attending: EMERGENCY MEDICINE

## 2020-03-05 DIAGNOSIS — R45.851 SUICIDAL IDEATION: ICD-10-CM

## 2020-03-05 DIAGNOSIS — F33.1 MODERATE EPISODE OF RECURRENT MAJOR DEPRESSIVE DISORDER (HCC): Primary | ICD-10-CM

## 2020-03-05 DIAGNOSIS — S60.819A WRIST ABRASION, NON-INFECTED: ICD-10-CM

## 2020-03-05 PROCEDURE — 80053 COMPREHEN METABOLIC PANEL: CPT | Performed by: NURSE PRACTITIONER

## 2020-03-05 PROCEDURE — 80307 DRUG TEST PRSMV CHEM ANLYZR: CPT | Performed by: EMERGENCY MEDICINE

## 2020-03-05 PROCEDURE — 84443 ASSAY THYROID STIM HORMONE: CPT | Performed by: NURSE PRACTITIONER

## 2020-03-05 PROCEDURE — 81003 URINALYSIS AUTO W/O SCOPE: CPT | Performed by: NURSE PRACTITIONER

## 2020-03-06 ENCOUNTER — HOSPITAL ENCOUNTER (INPATIENT)
Facility: HOSPITAL | Age: 45
LOS: 9 days | Discharge: ANOTHER HOSPITAL | End: 2020-03-15
Attending: SPECIALIST | Admitting: SPECIALIST

## 2020-03-06 VITALS
OXYGEN SATURATION: 99 % | HEART RATE: 76 BPM | TEMPERATURE: 98.8 F | SYSTOLIC BLOOD PRESSURE: 181 MMHG | WEIGHT: 250 LBS | HEIGHT: 64 IN | DIASTOLIC BLOOD PRESSURE: 99 MMHG | RESPIRATION RATE: 20 BRPM | BODY MASS INDEX: 42.68 KG/M2

## 2020-03-06 DIAGNOSIS — K42.0 UMBILICAL HERNIA, INCARCERATED: Primary | ICD-10-CM

## 2020-03-06 PROBLEM — N39.44 BED WETTING: Status: ACTIVE | Noted: 2020-03-06

## 2020-03-06 PROBLEM — I10 HYPERTENSION: Status: ACTIVE | Noted: 2020-03-06

## 2020-03-06 PROBLEM — N18.9 CKD (CHRONIC KIDNEY DISEASE): Status: ACTIVE | Noted: 2020-03-06

## 2020-03-06 PROBLEM — D64.9 CHRONIC ANEMIA: Status: ACTIVE | Noted: 2020-03-06

## 2020-03-06 PROBLEM — B37.2 YEAST INFECTION OF THE SKIN: Status: ACTIVE | Noted: 2020-03-06

## 2020-03-06 PROBLEM — T07.XXXA MULTIPLE ABRASIONS: Status: ACTIVE | Noted: 2020-03-06

## 2020-03-06 PROBLEM — F32.9 MAJOR DEPRESSION: Status: ACTIVE | Noted: 2020-03-06

## 2020-03-06 LAB
ALBUMIN SERPL-MCNC: 4.3 G/DL (ref 3.5–5.2)
ALBUMIN/GLOB SERPL: 1.7 G/DL
ALP SERPL-CCNC: 89 U/L (ref 39–117)
ALT SERPL W P-5'-P-CCNC: 11 U/L (ref 1–33)
AMPHET+METHAMPHET UR QL: NEGATIVE
ANION GAP SERPL CALCULATED.3IONS-SCNC: 17.8 MMOL/L (ref 5–15)
AST SERPL-CCNC: 13 U/L (ref 1–32)
BARBITURATES UR QL SCN: NEGATIVE
BASOPHILS # BLD AUTO: 0.03 10*3/MM3 (ref 0–0.2)
BASOPHILS NFR BLD AUTO: 0.6 % (ref 0–1.5)
BENZODIAZ UR QL SCN: NEGATIVE
BILIRUB SERPL-MCNC: 0.2 MG/DL (ref 0.2–1.2)
BILIRUB UR QL STRIP: NEGATIVE
BUN BLD-MCNC: 15 MG/DL (ref 6–20)
BUN/CREAT SERPL: 22.4 (ref 7–25)
CALCIUM SPEC-SCNC: 9.5 MG/DL (ref 8.6–10.5)
CANNABINOIDS SERPL QL: NEGATIVE
CHLORIDE SERPL-SCNC: 104 MMOL/L (ref 98–107)
CLARITY UR: CLEAR
CO2 SERPL-SCNC: 20.2 MMOL/L (ref 22–29)
COCAINE UR QL: NEGATIVE
COLOR UR: YELLOW
CREAT BLD-MCNC: 0.67 MG/DL (ref 0.57–1)
DEPRECATED RDW RBC AUTO: 49 FL (ref 37–54)
EOSINOPHIL # BLD AUTO: 0.29 10*3/MM3 (ref 0–0.4)
EOSINOPHIL NFR BLD AUTO: 6 % (ref 0.3–6.2)
ERYTHROCYTE [DISTWIDTH] IN BLOOD BY AUTOMATED COUNT: 16.3 % (ref 12.3–15.4)
ETHANOL BLD-MCNC: <10 MG/DL (ref 0–10)
ETHANOL UR QL: <0.01 %
GFR SERPL CREATININE-BSD FRML MDRD: 95 ML/MIN/1.73
GLOBULIN UR ELPH-MCNC: 2.6 GM/DL
GLUCOSE BLD-MCNC: 98 MG/DL (ref 65–99)
GLUCOSE UR STRIP-MCNC: NEGATIVE MG/DL
HCT VFR BLD AUTO: 31.4 % (ref 34–46.6)
HGB BLD-MCNC: 9.5 G/DL (ref 12–15.9)
HGB UR QL STRIP.AUTO: NEGATIVE
IMM GRANULOCYTES # BLD AUTO: 0.01 10*3/MM3 (ref 0–0.05)
IMM GRANULOCYTES NFR BLD AUTO: 0.2 % (ref 0–0.5)
KETONES UR QL STRIP: NEGATIVE
LEUKOCYTE ESTERASE UR QL STRIP.AUTO: NEGATIVE
LYMPHOCYTES # BLD AUTO: 1.67 10*3/MM3 (ref 0.7–3.1)
LYMPHOCYTES NFR BLD AUTO: 34.4 % (ref 19.6–45.3)
MCH RBC QN AUTO: 24.6 PG (ref 26.6–33)
MCHC RBC AUTO-ENTMCNC: 30.3 G/DL (ref 31.5–35.7)
MCV RBC AUTO: 81.3 FL (ref 79–97)
METHADONE UR QL SCN: NEGATIVE
MONOCYTES # BLD AUTO: 0.31 10*3/MM3 (ref 0.1–0.9)
MONOCYTES NFR BLD AUTO: 6.4 % (ref 5–12)
NEUTROPHILS # BLD AUTO: 2.55 10*3/MM3 (ref 1.7–7)
NEUTROPHILS NFR BLD AUTO: 52.4 % (ref 42.7–76)
NITRITE UR QL STRIP: NEGATIVE
NRBC BLD AUTO-RTO: 0 /100 WBC (ref 0–0.2)
OPIATES UR QL: POSITIVE
OXYCODONE UR QL SCN: NEGATIVE
PH UR STRIP.AUTO: 5.5 [PH] (ref 5–8)
PLATELET # BLD AUTO: 218 10*3/MM3 (ref 140–450)
PMV BLD AUTO: 10.9 FL (ref 6–12)
POTASSIUM BLD-SCNC: 3.8 MMOL/L (ref 3.5–5.2)
PROT SERPL-MCNC: 6.9 G/DL (ref 6–8.5)
PROT UR QL STRIP: ABNORMAL
RBC # BLD AUTO: 3.86 10*6/MM3 (ref 3.77–5.28)
SODIUM BLD-SCNC: 142 MMOL/L (ref 136–145)
SP GR UR STRIP: 1.03 (ref 1–1.03)
TSH SERPL DL<=0.05 MIU/L-ACNC: 1.98 UIU/ML (ref 0.27–4.2)
UROBILINOGEN UR QL STRIP: ABNORMAL
WBC NRBC COR # BLD: 4.86 10*3/MM3 (ref 3.4–10.8)

## 2020-03-06 PROCEDURE — 85025 COMPLETE CBC W/AUTO DIFF WBC: CPT | Performed by: NURSE PRACTITIONER

## 2020-03-06 PROCEDURE — 90791 PSYCH DIAGNOSTIC EVALUATION: CPT

## 2020-03-06 RX ORDER — ALPRAZOLAM 1 MG/1
1 TABLET ORAL ONCE
Status: COMPLETED | OUTPATIENT
Start: 2020-03-06 | End: 2020-03-06

## 2020-03-06 RX ORDER — BACLOFEN 10 MG/1
10 TABLET ORAL ONCE
Status: COMPLETED | OUTPATIENT
Start: 2020-03-06 | End: 2020-03-06

## 2020-03-06 RX ORDER — PRAZOSIN HYDROCHLORIDE 2 MG/1
4 CAPSULE ORAL NIGHTLY
Status: DISCONTINUED | OUTPATIENT
Start: 2020-03-06 | End: 2020-03-07

## 2020-03-06 RX ORDER — OXYCODONE HYDROCHLORIDE AND ACETAMINOPHEN 5; 325 MG/1; MG/1
1 TABLET ORAL 2 TIMES DAILY PRN
Status: DISCONTINUED | OUTPATIENT
Start: 2020-03-06 | End: 2020-03-06

## 2020-03-06 RX ORDER — OXYCODONE HYDROCHLORIDE AND ACETAMINOPHEN 5; 325 MG/1; MG/1
1 TABLET ORAL 3 TIMES DAILY
Status: DISCONTINUED | OUTPATIENT
Start: 2020-03-06 | End: 2020-03-15 | Stop reason: HOSPADM

## 2020-03-06 RX ORDER — BACLOFEN 10 MG/1
10 TABLET ORAL 3 TIMES DAILY
Status: DISCONTINUED | OUTPATIENT
Start: 2020-03-06 | End: 2020-03-15 | Stop reason: HOSPADM

## 2020-03-06 RX ORDER — ALUMINA, MAGNESIA, AND SIMETHICONE 2400; 2400; 240 MG/30ML; MG/30ML; MG/30ML
15 SUSPENSION ORAL EVERY 6 HOURS PRN
Status: DISCONTINUED | OUTPATIENT
Start: 2020-03-06 | End: 2020-03-15 | Stop reason: HOSPADM

## 2020-03-06 RX ORDER — FAMOTIDINE 40 MG/1
40 TABLET, FILM COATED ORAL ONCE
Status: COMPLETED | OUTPATIENT
Start: 2020-03-06 | End: 2020-03-06

## 2020-03-06 RX ORDER — OXYCODONE HYDROCHLORIDE AND ACETAMINOPHEN 5; 325 MG/1; MG/1
1 TABLET ORAL 2 TIMES DAILY PRN
COMMUNITY
End: 2020-03-15 | Stop reason: HOSPADM

## 2020-03-06 RX ORDER — OXYCODONE AND ACETAMINOPHEN 7.5; 325 MG/1; MG/1
1 TABLET ORAL ONCE
Status: COMPLETED | OUTPATIENT
Start: 2020-03-06 | End: 2020-03-06

## 2020-03-06 RX ORDER — ALPRAZOLAM 0.5 MG/1
0.5 TABLET ORAL 2 TIMES DAILY
Status: DISCONTINUED | OUTPATIENT
Start: 2020-03-06 | End: 2020-03-11

## 2020-03-06 RX ORDER — ALPRAZOLAM 1 MG/1
1 TABLET ORAL NIGHTLY
Status: DISCONTINUED | OUTPATIENT
Start: 2020-03-06 | End: 2020-03-15 | Stop reason: HOSPADM

## 2020-03-06 RX ORDER — DESVENLAFAXINE SUCCINATE 50 MG/1
50 TABLET, EXTENDED RELEASE ORAL DAILY
Status: DISCONTINUED | OUTPATIENT
Start: 2020-03-06 | End: 2020-03-15 | Stop reason: HOSPADM

## 2020-03-06 RX ORDER — ACETAMINOPHEN 325 MG/1
650 TABLET ORAL EVERY 4 HOURS PRN
Status: DISCONTINUED | OUTPATIENT
Start: 2020-03-06 | End: 2020-03-15 | Stop reason: HOSPADM

## 2020-03-06 RX ORDER — NYSTATIN 100000 [USP'U]/G
POWDER TOPICAL EVERY 12 HOURS SCHEDULED
Status: DISCONTINUED | OUTPATIENT
Start: 2020-03-06 | End: 2020-03-15 | Stop reason: HOSPADM

## 2020-03-06 RX ORDER — FAMOTIDINE 40 MG/1
40 TABLET, FILM COATED ORAL NIGHTLY
Status: DISCONTINUED | OUTPATIENT
Start: 2020-03-06 | End: 2020-03-15 | Stop reason: HOSPADM

## 2020-03-06 RX ORDER — FLUOXETINE HYDROCHLORIDE 20 MG/1
40 CAPSULE ORAL DAILY
Status: DISCONTINUED | OUTPATIENT
Start: 2020-03-06 | End: 2020-03-06

## 2020-03-06 RX ORDER — LOPERAMIDE HYDROCHLORIDE 2 MG/1
2 CAPSULE ORAL
Status: DISCONTINUED | OUTPATIENT
Start: 2020-03-06 | End: 2020-03-15 | Stop reason: HOSPADM

## 2020-03-06 RX ORDER — PRAZOSIN HYDROCHLORIDE 2 MG/1
4 CAPSULE ORAL ONCE
Status: COMPLETED | OUTPATIENT
Start: 2020-03-06 | End: 2020-03-06

## 2020-03-06 RX ADMIN — BACLOFEN 10 MG: 10 TABLET ORAL at 03:15

## 2020-03-06 RX ADMIN — OXYCODONE HYDROCHLORIDE AND ACETAMINOPHEN 1 TABLET: 5; 325 TABLET ORAL at 20:34

## 2020-03-06 RX ADMIN — BACLOFEN 10 MG: 10 TABLET ORAL at 16:09

## 2020-03-06 RX ADMIN — ALPRAZOLAM 0.5 MG: 0.5 TABLET ORAL at 11:44

## 2020-03-06 RX ADMIN — FAMOTIDINE 40 MG: 40 TABLET, FILM COATED ORAL at 04:30

## 2020-03-06 RX ADMIN — ALPRAZOLAM 1 MG: 1 TABLET ORAL at 20:32

## 2020-03-06 RX ADMIN — FAMOTIDINE 40 MG: 40 TABLET, FILM COATED ORAL at 20:28

## 2020-03-06 RX ADMIN — PRAZOSIN HYDROCHLORIDE 4 MG: 2 CAPSULE ORAL at 20:28

## 2020-03-06 RX ADMIN — OXYCODONE HYDROCHLORIDE AND ACETAMINOPHEN 1 TABLET: 5; 325 TABLET ORAL at 11:44

## 2020-03-06 RX ADMIN — LISINOPRIL: 10 TABLET ORAL at 11:43

## 2020-03-06 RX ADMIN — PRAZOSIN HYDROCHLORIDE 4 MG: 2 CAPSULE ORAL at 03:14

## 2020-03-06 RX ADMIN — BACLOFEN 10 MG: 10 TABLET ORAL at 11:43

## 2020-03-06 RX ADMIN — OXYCODONE HYDROCHLORIDE AND ACETAMINOPHEN 1 TABLET: 7.5; 325 TABLET ORAL at 03:08

## 2020-03-06 RX ADMIN — ALPRAZOLAM 1 MG: 1 TABLET ORAL at 03:08

## 2020-03-06 RX ADMIN — BACLOFEN 10 MG: 10 TABLET ORAL at 20:28

## 2020-03-06 RX ADMIN — DESVENLAFAXINE SUCCINATE 50 MG: 50 TABLET, EXTENDED RELEASE ORAL at 16:09

## 2020-03-06 NOTE — PLAN OF CARE
Problem: Patient Care Overview  Goal: Plan of Care Review  Outcome: Ongoing (interventions implemented as appropriate)  Flowsheets (Taken 3/6/2020 1520)  Progress: no change  Plan of Care Reviewed With: patient  Patient Agreement with Plan of Care: agrees  Outcome Summary: Pt has reied to attend all programming today. Was aditted early this am and not much sleep.Pt states she has continued having nocturnal incontinence since her previous admission . Pt was tearful and angry over an incident of misinformation regarding her scripts for Opiates, but it was resolved with an apology to the pt by Dr. Landry. Pt not actively suicidal in the hospital. Will be starting Pristiq today     Problem: Patient Care Overview  Goal: Individualization and Mutuality  Outcome: Ongoing (interventions implemented as appropriate)     Problem: Patient Care Overview  Goal: Discharge Needs Assessment  Outcome: Ongoing (interventions implemented as appropriate)     Problem: Patient Care Overview  Goal: Interprofessional Rounds/Family Conf  Outcome: Ongoing (interventions implemented as appropriate)     Problem: Overarching Goals (Adult)  Goal: Adheres to Safety Considerations for Self and Others  Outcome: Ongoing (interventions implemented as appropriate)  Flowsheets (Taken 3/6/2020 1520)  Adheres to Safety Considerations for Self and Others: making progress toward outcome     Problem: Overarching Goals (Adult)  Goal: Optimized Coping Skills in Response to Life Stressors  Outcome: Ongoing (interventions implemented as appropriate)  Flowsheets (Taken 3/6/2020 1520)  Optimized Coping Skills in Response to Life Stressors: making progress toward outcome     Problem: Overarching Goals (Adult)  Goal: Develops/Participates in Therapeutic [place] to Support Successful Transition  Outcome: Ongoing (interventions implemented as appropriate)  Flowsheets (Taken 3/6/2020 1520)  Develops/Participates in Therapeutic [place] to Support Successful  Transition: making progress toward outcome     Problem: Suicidal Behavior (Adult)  Goal: Suicidal Behavior is Absent/Minimized/Managed  Outcome: Ongoing (interventions implemented as appropriate)  Flowsheets  Taken 3/6/2020 1520 by Sara Gaona RN  Action Step/Short Term Goal (STG) Established: 03/06/20  Suicidal Behavior Managed/Minimized Time Frame for Action Step (STG): 4 days  Taken 3/6/2020 0434 by Kimmie Wilks RN  Suicidal Behavior Managed/Minimized Action Step (STG) Outcome: making progress toward outcome     Problem: Pain, Chronic (Adult)  Goal: Identify Related Risk Factors and Signs and Symptoms  Outcome: Ongoing (interventions implemented as appropriate)     Problem: Pain, Chronic (Adult)  Goal: Acceptable Pain/Comfort Level and Functional Ability  Outcome: Ongoing (interventions implemented as appropriate)  Flowsheets (Taken 3/6/2020 1520)  Acceptable Pain/Comfort Level and Functional Ability: making progress toward outcome

## 2020-03-06 NOTE — PLAN OF CARE
Problem: Patient Care Overview  Goal: Plan of Care Review  Outcome: Ongoing (interventions implemented as appropriate)  Flowsheets  Taken 3/6/2020 0434  Progress: no change  Plan of Care Reviewed With: patient  Taken 3/6/2020 0331  Patient Agreement with Plan of Care: agrees  Note:   Pt  brought to unit by security at 0240.  Skin assessment and vitals taken. Pt cooperative and compliant with medications.  Rates anxiety and depression 10/10.  Pt denies having any suicidal thoughts at this time.  Will continue to monitor mood and behavior and provide a safe environment.  Goal: Individualization and Mutuality  Outcome: Ongoing (interventions implemented as appropriate)  Goal: Discharge Needs Assessment  Outcome: Ongoing (interventions implemented as appropriate)  Goal: Interprofessional Rounds/Family Conf  Outcome: Ongoing (interventions implemented as appropriate)     Problem: Overarching Goals (Adult)  Goal: Adheres to Safety Considerations for Self and Others  Outcome: Ongoing (interventions implemented as appropriate)  Flowsheets (Taken 3/6/2020 0434)  Adheres to Safety Considerations for Self and Others: making progress toward outcome  Goal: Optimized Coping Skills in Response to Life Stressors  Outcome: Ongoing (interventions implemented as appropriate)  Flowsheets (Taken 3/6/2020 0434)  Optimized Coping Skills in Response to Life Stressors: making progress toward outcome  Goal: Develops/Participates in Therapeutic Grayson to Support Successful Transition  Outcome: Ongoing (interventions implemented as appropriate)  Flowsheets (Taken 3/6/2020 0434)  Develops/Participates in Therapeutic Grayson to Support Successful Transition: making progress toward outcome     Problem: Suicidal Behavior (Adult)  Goal: Suicidal Behavior is Absent/Minimized/Managed  Outcome: Ongoing (interventions implemented as appropriate)  Flowsheets (Taken 3/6/2020 0434)  Suicidal Behavior Managed/Minimized Action Step (STG) Outcome:  making progress toward outcome     Problem: Pain, Chronic (Adult)  Goal: Identify Related Risk Factors and Signs and Symptoms  Outcome: Ongoing (interventions implemented as appropriate)  Flowsheets (Taken 3/6/2020 1983)  Related Risk Factors (Chronic Pain): traumatic injury  Signs and Symptoms (Chronic Pain): verbalization of pain/discomfort for a prolonged time period; sleep pattern change  Goal: Acceptable Pain/Comfort Level and Functional Ability  Outcome: Ongoing (interventions implemented as appropriate)  Flowsheets (Taken 3/6/2020 1403)  Acceptable Pain/Comfort Level and Functional Ability: making progress toward outcome
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
## 2020-03-06 NOTE — PLAN OF CARE
Problem: Patient Care Overview  Goal: Discharge Needs Assessment  Outcome: Ongoing (interventions implemented as appropriate)  Flowsheets  Taken 3/6/2020 1513  Concerns Comments: Pt will return home.  Pt will receive an ALYX consult while inpt.  SW will explore outpt providers for continuity of care.  Taken 3/6/2020 1450  Transportation Concerns: car, none  Concerns to be Addressed: coping/stress;decision making;mental health;discharge planning;suicidal  Patient/Family Anticipated Services at Transition: mental health services  Patient/Family Anticipates Transition to: home

## 2020-03-06 NOTE — PROGRESS NOTES
Clinical Pharmacy Services: Medication History    Jesusita San is a 45 y.o. female presenting to [facility] for Major depression [F32.9]    She  has a past medical history of Back pain, Cervical cancer (CMS/[facility]), Depression, GERD (gastroesophageal reflux disease), and Hypertension.    Allergies as of 03/06/2020 - Reviewed 03/06/2020   Allergen Reaction Noted   • Tramadol Unknown - Low Severity 02/09/2020       Medication information was obtained from: pharmacy, EMR  Pharmacy: [facility]    Prior to Admission Medications     Prescriptions Last Dose Informant Patient Reported? Taking?    ALPRAZolam (XANAX) 0.5 MG tablet   No No    Take 1 tablet by mouth 2 (Two) Times a Day. Indications: Feeling Anxious    ALPRAZolam (XANAX) 1 MG tablet   No No    Take 1 tablet by mouth Every Night. Indications: Feeling Anxious    baclofen (LIORESAL) 10 MG tablet   No No    Take 1 tablet by mouth 3 (Three) Times a Day. Indications: Muscle Spasticity    famotidine (PEPCID) 40 MG tablet  Self Yes No    Take 40 mg by mouth Every Night.    FLUoxetine (PROzac) 40 MG capsule   No No    Take 2 capsules by mouth Daily    lisinopril-hydrochlorothiazide (PRINZIDE,ZESTORETIC) 20-25 MG per tablet   No No    Take 1 tablet by mouth Daily.    oxyCODONE-acetaminophen (PERCOCET) 5-325 MG per tablet  Pharmacy Yes No    Take 1 tablet by mouth 2 (Two) Times a Day As Needed for Moderate Pain  or Severe Pain .    prazosin (MINIPRESS) 1 MG capsule   No No    Take 4 capsules by mouth Every Night. Indications: nightmares            Medication notes: confirmed the percet directions as 5/325 and BID prn    This medication list is complete to the best of my knowledge as of 3/6/2020    Please call if questions.    Jamie Joe, [id number]  3/6/2020 9:20 AM

## 2020-03-06 NOTE — CONSULTS
A consult was requested on patient as it was mistakenly believed that she had received a prescription for pain meds after leaving the hospital with a supply to last until she saw her doctor.  It was confirmed that she did not get the second script until her doctor's appointment.  No ALYX consult needed.

## 2020-03-06 NOTE — CONSULTS
Adult Nutrition  Assessment/PES    Patient Name:  Jesusita San  YOB: 1975  MRN: [id number]  Admit Date:  3/6/2020    Assessment Date:  3/6/2020    Comments:  Consulted per nurse admission screen database - pt reported decreased appetite and weight loss. Presented to ED complaining of intermittent suicidal ideation that began about 5 days prior; self-cutting bilateral forearms; poor sleep. Has hx/o depression & anxiety. Reviewed weight hx, there has been no significant weight gain/loss in past year. BMI 42, obese. Labs reviewed, wnl. Skin intact, except for self-inflicted cuts. She's on regular diet, intake 75% of breakfast today. Low nutrition risk. Will monitor during stay - anticipate improvement as depression addressed.     Reason for Assessment     Row Name 03/06/20 1051          Reason for Assessment    Reason For Assessment  nurse/nurse practitioner consult     Diagnosis  psychosocial Mood problem (major depression); hx anxiety & depression, SI, cervical/uterine cancer, reflux, chronic back pain     Identified At Risk by Screening Criteria  no indicators present;other (see comments) Nutrition Screen indicates 2-13 lb weight loss, eating poorly d/t decreased appetite         Nutrition/Diet History     Row Name 03/06/20 1055          Nutrition/Diet History    Typical Food/Fluid Intake  Reportedly not sleeping, eating well d/t depression, night terrors     Factors Affecting Nutritional Intake  depression         Anthropometrics     Row Name 03/06/20 1059          Admit Weight    Admit Weight Method  stated     Admit Weight  113 kg (250 lb)        Usual Body Weight (UBW)    Usual Body Weight 114 kg (252 lb)  2/2020 252 lb,   7/2019 248 lb         Labs/Tests/Procedures/Meds     Row Name 03/06/20 1102          Labs/Procedures/Meds    Lab Results Reviewed  reviewed     Lab Results Comments  3/5 BMP wnl; urine tox +opiates        Diagnostic Tests/Procedures    Diagnostic Test/Procedure Reviewed   reviewed        Medications    Pertinent Medications Reviewed  reviewed     Pertinent Medications Comments  pepcid, prozac, baclofen         Physical Findings     Row Name 03/06/20 1101          Physical Findings    Overall Physical Appearance  obese     Skin  other (see comments) self-injuring including cutting her forearms (bilateral)           Nutrition Prescription Ordered     Row Name 03/06/20 1107          Nutrition Prescription PO    Current PO Diet  Regular     Fluid Consistency  Thin         Evaluation of Received Nutrient/Fluid Intake     Row Name 03/06/20 1111          PO Evaluation    Number of Days PO Intake Evaluated  1 day     Number of Meals  1     % PO Intake  75%               Problem/Interventions:  Problem 1     Row Name 03/06/20 1111          Nutrition Diagnoses Problem 1    Problem 1  Nutrition Appropriate for Condition at this Time               Intervention Goal     Row Name 03/06/20 1111          Intervention Goal    PO  Maintain intake;PO intake (%)     PO Intake %  75 %     Weight  No significant weight loss         Nutrition Intervention     Row Name 03/06/20 1112          Nutrition Intervention    RD/Tech Action  Menu provided;Encourage intake;Follow Tx progress           Education/Evaluation     Row Name 03/06/20 1112          Education    Education  No discharge needs identified at this time        Monitor/Evaluation    Monitor  Per protocol           Electronically signed by:  Christiane Sanchez RD  03/06/20 11:12 AM

## 2020-03-06 NOTE — PLAN OF CARE
Problem: Patient Care Overview  Goal: Individualization and Mutuality  Outcome: Ongoing (interventions implemented as appropriate)  Flowsheets (Taken 3/6/2020 1012)  Patient Personal Strengths: family/social support; stable living environment     Problem: Patient Care Overview  Goal: Interprofessional Rounds/Family Conf  Outcome: Ongoing (interventions implemented as appropriate)  Flowsheets (Taken 3/6/2020 1012)  Participants: ; pharmacy; pastoral care; social work; nursing; psychiatrist  Summary: Treatment team met to discuss pt's plan of care.  Pt will receive an ALYX consult while inpt.  SW will explore outpt providers.  Progress & svcs needed will be assessed ongoing.     Problem: Suicidal Behavior (Adult)  Goal: Suicidal Behavior is Absent/Minimized/Managed  Outcome: Ongoing (interventions implemented as appropriate)  Flowsheets  Taken 3/6/2020 1012 by Demetria De Leon CSW  Suicidal Behavior Managed/Minimized Time Frame for Action Step (STG): 4 days  Taken 3/6/2020 0434 by Kimmie Wilks RN  Suicidal Behavior Managed/Minimized Action Step (STG) Outcome: making progress toward outcome    Patient/Guardian Signature: __________________________________            Psychiatrist Signature: ______________________________________             Therapist Signature: ________________________________________         Nurse Signature: ___________________________________________          Staff Signature: ____________________________________________            Staff Signature: ____________________________________________          Staff Signature: ____________________________________________          Staff Signature:
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
## 2020-03-06 NOTE — PROGRESS NOTES
Continued Stay Note  [facility]     Patient Name: Jesusita San  MRN: [id number]  Today's Date: 3/6/2020    Admit Date: 3/6/2020    Discharge Plan     Row Name 03/06/20 1507       Plan    Plan  Pt will return home.  Pt will receive an ALYX consult while inpt.  SW will explore outpt providers for continuity of care.     Plan Comments  SW met w/pt to discus tx & d/c planning.  Pt was able to verify demographic info as well as PCP w/[facility].  Pt stated that she also goes to a pain clinic.  When asked about outpt mental health providers; pt stated that she was scheduled at [facility] the last admission but did not make appts.  Pt stated that she would like to be set up w/[facility] again and be assigned a .  SW discussed meds to beds w/pt.  SW verified pt's emergency contact w/pt.        Discharge Codes    No documentation.             MIRELLA Matthews

## 2020-03-06 NOTE — ED PROVIDER NOTES
" EMERGENCY DEPARTMENT ENCOUNTER    CHIEF COMPLAINT  Chief Complaint: suicidal ideation  History given by: patient  History limited by: none  Room Number: 14/14  PMD: Provider, No Known      HPI:  Pt is a 45 y.o. female who presents complaining of intermittent suicidal ideation that began about 5 days ago. Pt affirms she has been self-injuring including cutting her forearms. Per pt, she witnessed her son attempt suicide recently. She also notes that she has had some positivity this week but reports that this has not diminished her thoughts of suicide. Pt states she takes Prozac daily and has Hx of depression and anxiety. She denies taking any illicit drugs or drinking EtOH. Pt reports that she has Hx of similar episode in which she was admitted but had to leave AMA due to \"[her] daughter needing [her].\"              PAST MEDICAL HISTORY  Active Ambulatory Problems     Diagnosis Date Noted   • Suicidal ideation 02/10/2020   • Chronic back pain 02/10/2020   • Anxiety and depression 02/10/2020   • Cervical cancer (CMS/[facility]) 02/10/2020   • GERD without esophagitis 02/10/2020   • Major depression 03/06/2020     Resolved Ambulatory Problems     Diagnosis Date Noted   • No Resolved Ambulatory Problems     Past Medical History:   Diagnosis Date   • Back pain    • Depression    • GERD (gastroesophageal reflux disease)    • Hypertension        PAST SURGICAL HISTORY  History reviewed. No pertinent surgical history.    FAMILY HISTORY  History reviewed. No pertinent family history.    SOCIAL HISTORY  Social History     Socioeconomic History   • Marital status: Single     Spouse name: Not on file   • Number of children: Not on file   • Years of education: Not on file   • Highest education level: Not on file   Tobacco Use   • Smoking status: Never Smoker   • Smokeless tobacco: Never Used   Substance and Sexual Activity   • Alcohol use: Yes     Comment: \"socially\"   • Drug use: Not Currently   • Sexual activity: Defer "       ALLERGIES  Tramadol    REVIEW OF SYSTEMS  Review of Systems   Constitutional: Negative for fever.   HENT: Negative for sore throat.    Eyes: Negative.    Respiratory: Negative for cough and shortness of breath.    Cardiovascular: Negative for chest pain.   Gastrointestinal: Negative for abdominal pain, diarrhea and vomiting.   Genitourinary: Negative for dysuria.   Musculoskeletal: Negative for neck pain.   Skin: Negative for rash.   Allergic/Immunologic: Negative.    Neurological: Negative for weakness, numbness and headaches.   Hematological: Negative.    Psychiatric/Behavioral: Positive for self-injury and suicidal ideas.   All other systems reviewed and are negative.      PHYSICAL EXAM  ED Triage Vitals [03/05/20 2315]   Temp Heart Rate Resp BP SpO2   98.8 °F (37.1 °C) 106 20 -- 99 %      Temp src Heart Rate Source Patient Position BP Location FiO2 (%)   Tympanic -- -- -- --       Physical Exam   Constitutional: She is oriented to person, place, and time.   HENT:   Head: Normocephalic and atraumatic.   Eyes: Pupils are equal, round, and reactive to light. EOM are normal.   Neck: Normal range of motion. Neck supple.   Cardiovascular: Normal rate, regular rhythm and normal heart sounds.   Pulmonary/Chest: Effort normal and breath sounds normal. No respiratory distress.   Abdominal: Soft. There is no tenderness. There is no rebound and no guarding.   Musculoskeletal: Normal range of motion. She exhibits no edema.   Neurological: She is alert and oriented to person, place, and time. She has normal sensation and normal strength.   No focal neurological deficits   Skin: Skin is warm and dry. No rash noted.   Psychiatric: Affect normal. She exhibits a depressed mood.   Intermittently tearful   Nursing note and vitals reviewed.      LAB RESULTS  Lab Results (last 24 hours)     Procedure Component Value Units Date/Time    Ethanol [[id number]] Collected:  03/05/20 2340    Specimen:  Blood Updated:  03/06/20 0005      Ethanol <10 mg/dL      Ethanol % <0.010 %     Urine Drug Screen - Urine, Clean Catch [[id number]]  (Abnormal) Collected:  03/05/20 2340    Specimen:  Urine, Clean Catch Updated:  03/06/20 0012     Amphet/Methamphet, Screen Negative     Barbiturates Screen, Urine Negative     Benzodiazepine Screen, Urine Negative     Cocaine Screen, Urine Negative     Opiate Screen Positive     THC, Screen, Urine Negative     Methadone Screen, Urine Negative     Oxycodone Screen, Urine Negative    Narrative:       Negative Thresholds For Drugs Screened:     Amphetamines               500 ng/ml   Barbiturates               200 ng/ml   Benzodiazepines            100 ng/ml   Cocaine                    300 ng/ml   Methadone                  300 ng/ml   Opiates                    300 ng/ml   Oxycodone                  100 ng/ml   THC                        50 ng/ml    The Normal Value for all drugs tested is negative. This report includes final unconfirmed screening results to be used for medical treatment purposes only. Unconfirmed results must not be used for non-medical purposes such as employment or legal testing. Clinical consideration should be applied to any drug of abuse test, particulary when unconfirmed results are used.          I ordered the above labs and reviewed the results      PROCEDURES  Procedures          PROGRESS AND CONSULTS     2327: Upon initial exam, discussed plan for behavioral health RN consult and likely admission. Pt is agreeable to plan for admission, all questions answered.    0047: Spoke with Gustabo Hernandez RN about pt case. She reports that she has evaluated the patient at bedside and will speak to an admitting physician regarding her evaluation. Plan is to admit the patient to behavior health.     MEDICAL DECISION MAKING  Results were reviewed/discussed with the patient and they were also made aware of online access. Pt also made aware that some labs, such as cultures, will not be resulted during ER  visit and follow up with PMD is necessary.     MDM  Number of Diagnoses or Management Options  Moderate episode of recurrent major depressive disorder (CMS/HCC):   Suicidal ideation:   Wrist abrasion, non-infected, bilateral:      Amount and/or Complexity of Data Reviewed  Clinical lab tests: ordered and reviewed (Positive for opiates, negative EtOH)           DIAGNOSIS  Final diagnoses:   Moderate episode of recurrent major depressive disorder (CMS/HCC)   Suicidal ideation   Wrist abrasion, non-infected, bilateral       DISPOSITION  ADMISSION    Discussed treatment plan and reason for admission with pt/family and admitting physician.  Pt/family voiced understanding of the plan for admission for further testing/treatment as needed.         Latest Documented Vital Signs:  As of 6:37 AM  BP- (!) 181/99 HR- 76 Temp- 98.8 °F (37.1 °C) (Tympanic) O2 sat- 99%    --  Documentation assistance provided by samantha Rai for Dr. Cowan.  Information recorded by the scribe was done at my direction and has been verified and validated by me.               Vandana Rai  03/06/20 0334       Blayne Cowan MD  03/06/20 0637

## 2020-03-06 NOTE — ED TRIAGE NOTES
"Pt complains of suicidal thoughts this evening which started Sunday. She denies any ETOH or taking too many of her pills. Her roommate hid her pills from her tonight. Pt reports she has been cutting and has only superficial cuts on both forearms; I do not see any bleeding from what she showed me. She is anxious but cooperative.   Her daughter dropped her off.    She has some family stress and reports she has been here within the past few weeks and left early (AMA) because her daughter needed her. She states \"I should have stayed\". She says that her son also attempted suicide recently as well.  "

## 2020-03-07 RX ORDER — PRAZOSIN HYDROCHLORIDE 5 MG/1
5 CAPSULE ORAL NIGHTLY
Status: DISCONTINUED | OUTPATIENT
Start: 2020-03-07 | End: 2020-03-15 | Stop reason: HOSPADM

## 2020-03-07 RX ADMIN — PRAZOSIN HYDROCHLORIDE 5 MG: 5 CAPSULE ORAL at 23:30

## 2020-03-07 RX ADMIN — ALPRAZOLAM 0.5 MG: 0.5 TABLET ORAL at 16:20

## 2020-03-07 RX ADMIN — OXYCODONE HYDROCHLORIDE AND ACETAMINOPHEN 1 TABLET: 5; 325 TABLET ORAL at 16:20

## 2020-03-07 RX ADMIN — LISINOPRIL: 10 TABLET ORAL at 08:28

## 2020-03-07 RX ADMIN — FAMOTIDINE 40 MG: 40 TABLET, FILM COATED ORAL at 22:11

## 2020-03-07 RX ADMIN — NYSTATIN: 100000 POWDER TOPICAL at 22:11

## 2020-03-07 RX ADMIN — BACLOFEN 10 MG: 10 TABLET ORAL at 22:11

## 2020-03-07 RX ADMIN — ALPRAZOLAM 1 MG: 1 TABLET ORAL at 22:11

## 2020-03-07 RX ADMIN — OXYCODONE HYDROCHLORIDE AND ACETAMINOPHEN 1 TABLET: 5; 325 TABLET ORAL at 22:11

## 2020-03-07 RX ADMIN — ACETAMINOPHEN 325 MG: 325 TABLET, FILM COATED ORAL at 16:20

## 2020-03-07 RX ADMIN — BACLOFEN 10 MG: 10 TABLET ORAL at 16:20

## 2020-03-07 RX ADMIN — NYSTATIN 1 APPLICATION: 100000 POWDER TOPICAL at 08:58

## 2020-03-07 RX ADMIN — OXYCODONE HYDROCHLORIDE AND ACETAMINOPHEN 1 TABLET: 5; 325 TABLET ORAL at 08:29

## 2020-03-07 RX ADMIN — BACLOFEN 10 MG: 10 TABLET ORAL at 08:29

## 2020-03-07 RX ADMIN — ALPRAZOLAM 0.5 MG: 0.5 TABLET ORAL at 08:29

## 2020-03-07 RX ADMIN — ACETAMINOPHEN 650 MG: 325 TABLET, FILM COATED ORAL at 03:01

## 2020-03-07 RX ADMIN — DESVENLAFAXINE SUCCINATE 50 MG: 50 TABLET, EXTENDED RELEASE ORAL at 08:28
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
## 2020-03-07 NOTE — PROGRESS NOTES
Patient is seen, evaluated, and chart reviewed. Discussed with staff.  Patient is seen for Dr. Landry.    Staff reports that patient has been cooperative and compliant with medications.  No behavioral issues.  She has been attending groups.    On examination, patient is found in the milieu.  Patient slept poorly last night.  Patient is complaining of nightmares.  Mood is depressed and anxious.  Affect congruent.  She reports positive, vague SI.  She is able to contract for safety within the hospital setting.  No HI/AVH.  Thought processes are mostly goal-directed.  Judgment and insight are okay.    No medication side effects.  She has tolerated the initiation of Pristiq.  I will increase prazosin to 5 mg at bedtime.  Will monitor blood pressure.  Patient is provided with supportive therapy.  Continue current medications, therapy, and inpatient treatment plan for safety and stabilization.

## 2020-03-08 RX ADMIN — FAMOTIDINE 40 MG: 40 TABLET, FILM COATED ORAL at 21:30

## 2020-03-08 RX ADMIN — BACLOFEN 10 MG: 10 TABLET ORAL at 09:32

## 2020-03-08 RX ADMIN — OXYCODONE HYDROCHLORIDE AND ACETAMINOPHEN 1 TABLET: 5; 325 TABLET ORAL at 15:50

## 2020-03-08 RX ADMIN — OXYCODONE HYDROCHLORIDE AND ACETAMINOPHEN 1 TABLET: 5; 325 TABLET ORAL at 21:30

## 2020-03-08 RX ADMIN — ALPRAZOLAM 0.5 MG: 0.5 TABLET ORAL at 15:49

## 2020-03-08 RX ADMIN — OXYCODONE HYDROCHLORIDE AND ACETAMINOPHEN 1 TABLET: 5; 325 TABLET ORAL at 09:32

## 2020-03-08 RX ADMIN — NYSTATIN: 100000 POWDER TOPICAL at 09:32

## 2020-03-08 RX ADMIN — BACLOFEN 10 MG: 10 TABLET ORAL at 21:30

## 2020-03-08 RX ADMIN — NYSTATIN: 100000 POWDER TOPICAL at 21:30

## 2020-03-08 RX ADMIN — BACLOFEN 10 MG: 10 TABLET ORAL at 15:50

## 2020-03-08 RX ADMIN — DESVENLAFAXINE SUCCINATE 50 MG: 50 TABLET, EXTENDED RELEASE ORAL at 09:32

## 2020-03-08 RX ADMIN — LISINOPRIL: 10 TABLET ORAL at 09:32

## 2020-03-08 RX ADMIN — ALPRAZOLAM 0.5 MG: 0.5 TABLET ORAL at 09:32

## 2020-03-08 RX ADMIN — ACETAMINOPHEN 650 MG: 325 TABLET, FILM COATED ORAL at 14:33

## 2020-03-08 RX ADMIN — MUPIROCIN 1 APPLICATION: 20 OINTMENT TOPICAL at 21:41

## 2020-03-08 RX ADMIN — PRAZOSIN HYDROCHLORIDE 5 MG: 5 CAPSULE ORAL at 21:30

## 2020-03-08 RX ADMIN — ALPRAZOLAM 1 MG: 1 TABLET ORAL at 21:30

## 2020-03-08 RX ADMIN — MUPIROCIN: 20 OINTMENT TOPICAL at 15:54

## 2020-03-08 NOTE — PROGRESS NOTES
Patient is seen, evaluated, and chart reviewed. Discussed with staff.  Patient is seen for Dr. Landry.    Staff reports that patient has been cooperative and compliant with medications.  No behavioral issues.    On examination, patient is found in her room.  She is complaining of a headache.  Patient slept better last night.  She denies having any nightmares or episodes of enuresis.  Mood is depressed and anxious.  Affect congruent.  She reports vague, passive SI.  No HI/AVH.  Thought processes are goal-directed.  Judgment and insight are okay.    No medication side effects.  Patient is provided with supportive therapy.  Continue current medications, therapy, and inpatient treatment plan for safety and stabilization.  Dr. Landry will resume care tomorrow.

## 2020-03-08 NOTE — PLAN OF CARE
Pt is calm and compliant with meds and unit routine. She does endorse depression and anxiety but is able to maintain safety in hospital. She has frequent somatic complaints, seeking PRNs for headache and neck pain. She has been independent in room between meals and group.     Problem: Patient Care Overview  Goal: Plan of Care Review  Outcome: Ongoing (interventions implemented as appropriate)  Flowsheets  Taken 3/8/2020 1514  Progress: no change  Taken 3/8/2020 0932  Plan of Care Reviewed With: patient  Patient Agreement with Plan of Care: agrees  Goal: Individualization and Mutuality  Outcome: Ongoing (interventions implemented as appropriate)  Goal: Discharge Needs Assessment  Outcome: Ongoing (interventions implemented as appropriate)  Goal: Interprofessional Rounds/Family Conf  Outcome: Ongoing (interventions implemented as appropriate)     Problem: Overarching Goals (Adult)  Goal: Adheres to Safety Considerations for Self and Others  Outcome: Ongoing (interventions implemented as appropriate)  Goal: Optimized Coping Skills in Response to Life Stressors  Outcome: Ongoing (interventions implemented as appropriate)  Goal: Develops/Participates in Therapeutic [place] to Support Successful Transition  Outcome: Ongoing (interventions implemented as appropriate)     Problem: Suicidal Behavior (Adult)  Goal: Suicidal Behavior is Absent/Minimized/Managed  Outcome: Ongoing (interventions implemented as appropriate)     Problem: Pain, Chronic (Adult)  Goal: Identify Related Risk Factors and Signs and Symptoms  Outcome: Ongoing (interventions implemented as appropriate)  Goal: Acceptable Pain/Comfort Level and Functional Ability  Outcome: Ongoing (interventions implemented as appropriate)

## 2020-03-08 NOTE — PLAN OF CARE
"Pt has been open with staff, cooperative with care and compliant with meds this shift. Pt continues to voice a desire to \"not be alive\" but does not have a plan and contracts for safety. Pt reports that she feels safe here. Pt rates depression 10/10 and anxiety 7/10. Pt denies AVH. Pt reports hopelessness and despair. Pt has a difficult time falling asleep. Pt slept for 5+ hours.        Problem: Patient Care Overview  Goal: Plan of Care Review  Outcome: Ongoing (interventions implemented as appropriate)  Flowsheets (Taken 3/8/2020 0514)  Progress: no change  Plan of Care Reviewed With: patient  Patient Agreement with Plan of Care: agrees  Goal: Individualization and Mutuality  Outcome: Ongoing (interventions implemented as appropriate)  Goal: Discharge Needs Assessment  Outcome: Ongoing (interventions implemented as appropriate)  Goal: Interprofessional Rounds/Family Conf  Outcome: Ongoing (interventions implemented as appropriate)     Problem: Overarching Goals (Adult)  Goal: Adheres to Safety Considerations for Self and Others  Outcome: Ongoing (interventions implemented as appropriate)  Flowsheets (Taken 3/8/2020 0514)  Adheres to Safety Considerations for Self and Others: making progress toward outcome  Goal: Optimized Coping Skills in Response to Life Stressors  Outcome: Ongoing (interventions implemented as appropriate)  Flowsheets (Taken 3/8/2020 0514)  Optimized Coping Skills in Response to Life Stressors: making progress toward outcome  Goal: Develops/Participates in Therapeutic [place] to Support Successful Transition  Outcome: Ongoing (interventions implemented as appropriate)  Flowsheets (Taken 3/8/2020 0514)  Develops/Participates in Therapeutic [place] to Support Successful Transition: making progress toward outcome     Problem: Suicidal Behavior (Adult)  Goal: Suicidal Behavior is Absent/Minimized/Managed  Outcome: Ongoing (interventions implemented as appropriate)  Flowsheets (Taken 3/8/2020 " 0514)  Suicidal Behavior Managed/Minimized Action Step (STG) Outcome: making progress toward outcome     Problem: Pain, Chronic (Adult)  Goal: Identify Related Risk Factors and Signs and Symptoms  Outcome: Ongoing (interventions implemented as appropriate)  Goal: Acceptable Pain/Comfort Level and Functional Ability  Outcome: Ongoing (interventions implemented as appropriate)

## 2020-03-09 RX ADMIN — BACLOFEN 10 MG: 10 TABLET ORAL at 21:14

## 2020-03-09 RX ADMIN — NYSTATIN: 100000 POWDER TOPICAL at 08:45

## 2020-03-09 RX ADMIN — OXYCODONE HYDROCHLORIDE AND ACETAMINOPHEN 1 TABLET: 5; 325 TABLET ORAL at 16:10

## 2020-03-09 RX ADMIN — NYSTATIN: 100000 POWDER TOPICAL at 21:13

## 2020-03-09 RX ADMIN — ALPRAZOLAM 0.5 MG: 0.5 TABLET ORAL at 08:43

## 2020-03-09 RX ADMIN — OXYCODONE HYDROCHLORIDE AND ACETAMINOPHEN 1 TABLET: 5; 325 TABLET ORAL at 08:44

## 2020-03-09 RX ADMIN — MUPIROCIN: 20 OINTMENT TOPICAL at 21:13

## 2020-03-09 RX ADMIN — MUPIROCIN: 20 OINTMENT TOPICAL at 08:45

## 2020-03-09 RX ADMIN — BACLOFEN 10 MG: 10 TABLET ORAL at 16:09

## 2020-03-09 RX ADMIN — PRAZOSIN HYDROCHLORIDE 5 MG: 5 CAPSULE ORAL at 21:14

## 2020-03-09 RX ADMIN — FAMOTIDINE 40 MG: 40 TABLET, FILM COATED ORAL at 21:14

## 2020-03-09 RX ADMIN — DESVENLAFAXINE SUCCINATE 50 MG: 50 TABLET, EXTENDED RELEASE ORAL at 08:44

## 2020-03-09 RX ADMIN — OXYCODONE HYDROCHLORIDE AND ACETAMINOPHEN 1 TABLET: 5; 325 TABLET ORAL at 21:14

## 2020-03-09 RX ADMIN — ALPRAZOLAM 1 MG: 1 TABLET ORAL at 21:14

## 2020-03-09 RX ADMIN — BACLOFEN 10 MG: 10 TABLET ORAL at 08:44

## 2020-03-09 RX ADMIN — ACETAMINOPHEN 650 MG: 325 TABLET, FILM COATED ORAL at 14:10

## 2020-03-09 RX ADMIN — ALPRAZOLAM 0.5 MG: 0.5 TABLET ORAL at 16:10

## 2020-03-09 RX ADMIN — LISINOPRIL: 10 TABLET ORAL at 08:43

## 2020-03-09 NOTE — PLAN OF CARE
"Pt has been cooperative with care and compliant with all meds. Pt denies SI/HI/AVH. Pt with flat affect and expressing helplessness/hopelessness. Pt reports that she is a \"ball of anxiety\" and \"just doesn't care about anything\".  Pt continues to contract for safety and denies any current self-harming behaviors. Pt reports that she does not have a strong support system which adds to her anxiety. Pt has slept all night.       Problem: Patient Care Overview  Goal: Plan of Care Review  Outcome: Ongoing (interventions implemented as appropriate)  Flowsheets (Taken 3/9/2020 0440)  Progress: no change  Plan of Care Reviewed With: patient  Patient Agreement with Plan of Care: agrees  Goal: Individualization and Mutuality  Outcome: Ongoing (interventions implemented as appropriate)  Goal: Discharge Needs Assessment  Outcome: Ongoing (interventions implemented as appropriate)  Goal: Interprofessional Rounds/Family Conf  Outcome: Ongoing (interventions implemented as appropriate)     Problem: Overarching Goals (Adult)  Goal: Adheres to Safety Considerations for Self and Others  Outcome: Ongoing (interventions implemented as appropriate)  Flowsheets (Taken 3/9/2020 0440)  Adheres to Safety Considerations for Self and Others: making progress toward outcome  Goal: Optimized Coping Skills in Response to Life Stressors  Outcome: Ongoing (interventions implemented as appropriate)  Flowsheets (Taken 3/9/2020 0440)  Optimized Coping Skills in Response to Life Stressors: making progress toward outcome  Goal: Develops/Participates in Therapeutic [place] to Support Successful Transition  Outcome: Ongoing (interventions implemented as appropriate)  Flowsheets (Taken 3/9/2020 0440)  Develops/Participates in Therapeutic [place] to Support Successful Transition: making progress toward outcome     Problem: Suicidal Behavior (Adult)  Goal: Suicidal Behavior is Absent/Minimized/Managed  Outcome: Ongoing (interventions implemented as " appropriate)  Flowsheets (Taken 3/9/2020 7850)  Suicidal Behavior Managed/Minimized Action Step (STG) Outcome: making progress toward outcome     Problem: Pain, Chronic (Adult)  Goal: Identify Related Risk Factors and Signs and Symptoms  Outcome: Ongoing (interventions implemented as appropriate)  Goal: Acceptable Pain/Comfort Level and Functional Ability  Outcome: Ongoing (interventions implemented as appropriate)

## 2020-03-09 NOTE — PLAN OF CARE
Patient has been somewhat flat and withdrawn but cooperative with medications.  She feels the newly started medication, Pristiq, has been causing her to have headaches but is ok with continuing.  Pt denies SI and HI, but feels hopeless and emotionally blunted.   Problem: Patient Care Overview  Goal: Plan of Care Review  Outcome: Ongoing (interventions implemented as appropriate)  Goal: Individualization and Mutuality  Outcome: Ongoing (interventions implemented as appropriate)  Goal: Discharge Needs Assessment  Outcome: Ongoing (interventions implemented as appropriate)  Goal: Interprofessional Rounds/Family Conf  Outcome: Ongoing (interventions implemented as appropriate)     Problem: Overarching Goals (Adult)  Goal: Adheres to Safety Considerations for Self and Others  Outcome: Ongoing (interventions implemented as appropriate)  Goal: Optimized Coping Skills in Response to Life Stressors  Outcome: Ongoing (interventions implemented as appropriate)  Goal: Develops/Participates in Therapeutic [place] to Support Successful Transition  Outcome: Ongoing (interventions implemented as appropriate)     Problem: Suicidal Behavior (Adult)  Goal: Suicidal Behavior is Absent/Minimized/Managed  Outcome: Ongoing (interventions implemented as appropriate)     Problem: Pain, Chronic (Adult)  Goal: Identify Related Risk Factors and Signs and Symptoms  Outcome: Ongoing (interventions implemented as appropriate)  Goal: Acceptable Pain/Comfort Level and Functional Ability  Outcome: Ongoing (interventions implemented as appropriate)     Problem: Patient Care Overview  Goal: Plan of Care Review  Outcome: Ongoing (interventions implemented as appropriate)  Goal: Individualization and Mutuality  Outcome: Ongoing (interventions implemented as appropriate)  Goal: Discharge Needs Assessment  Outcome: Ongoing (interventions implemented as appropriate)  Goal: Interprofessional Rounds/Family Conf  Outcome: Ongoing (interventions implemented  as appropriate)     Problem: Overarching Goals (Adult)  Goal: Adheres to Safety Considerations for Self and Others  Outcome: Ongoing (interventions implemented as appropriate)  Goal: Optimized Coping Skills in Response to Life Stressors  Outcome: Ongoing (interventions implemented as appropriate)  Goal: Develops/Participates in Therapeutic [place] to Support Successful Transition  Outcome: Ongoing (interventions implemented as appropriate)     Problem: Suicidal Behavior (Adult)  Goal: Suicidal Behavior is Absent/Minimized/Managed  Outcome: Ongoing (interventions implemented as appropriate)

## 2020-03-09 NOTE — PROGRESS NOTES
"The patient continues to report \"bad thoughts\".  She is able to promise safety but does report recurrence of suicidal ideation.  We continue her trial of Pristiq.  I have spoken with her today regarding the latency of onset of action of this medication for depression and for chronic pain.  "

## 2020-03-10 RX ADMIN — NYSTATIN: 100000 POWDER TOPICAL at 21:24

## 2020-03-10 RX ADMIN — MUPIROCIN: 20 OINTMENT TOPICAL at 08:42

## 2020-03-10 RX ADMIN — LISINOPRIL: 10 TABLET ORAL at 08:42

## 2020-03-10 RX ADMIN — ALPRAZOLAM 0.5 MG: 0.5 TABLET ORAL at 16:23

## 2020-03-10 RX ADMIN — OXYCODONE HYDROCHLORIDE AND ACETAMINOPHEN 1 TABLET: 5; 325 TABLET ORAL at 21:24

## 2020-03-10 RX ADMIN — ALPRAZOLAM 1 MG: 1 TABLET ORAL at 21:24

## 2020-03-10 RX ADMIN — BACLOFEN 10 MG: 10 TABLET ORAL at 08:42

## 2020-03-10 RX ADMIN — BACLOFEN 10 MG: 10 TABLET ORAL at 16:23

## 2020-03-10 RX ADMIN — DESVENLAFAXINE SUCCINATE 50 MG: 50 TABLET, EXTENDED RELEASE ORAL at 08:42

## 2020-03-10 RX ADMIN — OXYCODONE HYDROCHLORIDE AND ACETAMINOPHEN 1 TABLET: 5; 325 TABLET ORAL at 08:42

## 2020-03-10 RX ADMIN — OXYCODONE HYDROCHLORIDE AND ACETAMINOPHEN 1 TABLET: 5; 325 TABLET ORAL at 16:23

## 2020-03-10 RX ADMIN — PRAZOSIN HYDROCHLORIDE 5 MG: 5 CAPSULE ORAL at 21:24

## 2020-03-10 RX ADMIN — BACLOFEN 10 MG: 10 TABLET ORAL at 21:24

## 2020-03-10 RX ADMIN — MUPIROCIN: 20 OINTMENT TOPICAL at 21:24

## 2020-03-10 RX ADMIN — NYSTATIN: 100000 POWDER TOPICAL at 08:42

## 2020-03-10 RX ADMIN — ALPRAZOLAM 0.5 MG: 0.5 TABLET ORAL at 08:42

## 2020-03-10 NOTE — PROGRESS NOTES
"The patient reports that her mood is somewhat worse today, and she is reporting increased hopelessness.  Trial of Pristiq continues.  I have encouraged the patient to continue active participation in therapeutic milieu and have explained to her that \"every day will be better than the last\".  She continues to endorse thoughts of suicide, but is able to promise safety in the hospital.  "

## 2020-03-10 NOTE — PROGRESS NOTES
Adult Nutrition  Assessment/PES    Patient Name:  Jesusita San  YOB: 1975  MRN: [id number]  Admit Date:  3/6/2020    Assessment Date:  3/10/2020    Comments:  Chart reviewed, intake 100% most meals. Sign off.     Reason for Assessment     Row Name 03/10/20 1120          Reason for Assessment    Reason For Assessment  follow-up protocol         Nutrition/Diet History     Row Name 03/10/20 1120          Nutrition/Diet History    Typical Food/Fluid Intake  Intake 100% most every meal. Still telling MD depression 10/10.            Labs/Tests/Procedures/Meds     Row Name 03/10/20 1121          Labs/Procedures/Meds    Lab Results Reviewed  reviewed     Lab Results Comments  no new        Diagnostic Tests/Procedures    Diagnostic Test/Procedure Reviewed  reviewed        Medications    Pertinent Medications Reviewed  reviewed         Physical Findings     Row Name 03/10/20 1121          Physical Findings    Skin  other (see comments) no impairment           Nutrition Prescription Ordered     Row Name 03/10/20 1121          Nutrition Prescription PO    Common Modifiers  GI Soft/[place]         Evaluation of Received Nutrient/Fluid Intake     Row Name 03/10/20 1121          PO Evaluation    Number of Days PO Intake Evaluated  2 days     % PO Intake  100           Problem/Interventions:      Intervention Goal     Row Name 03/10/20 1121          Intervention Goal    General  Maintain nutrition;Disease management/therapy     PO  Maintain intake         Nutrition Intervention     Row Name 03/10/20 1121          Nutrition Intervention    RD/Tech Action  Menu provided;Encourage intake           Education/Evaluation     Row Name 03/10/20 1121          Education    Education  No discharge needs identified at this time        Monitor/Evaluation    Monitor  Per protocol           Electronically signed by:  Christiane Sanchez RD  03/10/20 11:22

## 2020-03-10 NOTE — PLAN OF CARE
Problem: Patient Care Overview  Goal: Plan of Care Review  Outcome: Ongoing (interventions implemented as appropriate)  Flowsheets (Taken 3/10/2020 0418)  Progress: improving  Plan of Care Reviewed With: patient  Patient Agreement with Plan of Care: agrees  Note:   Pt was cooperative, compliant with medications and care, pleasant. Pt denied SI, HI, and hallucinations. Pt voiced anxiety 8/10 and depression 10/10. Pt had a flat affect and tearful. Pt slept all night. Will continue to monitor behaviors, provide support and safe environment.

## 2020-03-11 RX ORDER — ALPRAZOLAM 0.5 MG/1
0.5 TABLET ORAL 3 TIMES DAILY
Status: DISCONTINUED | OUTPATIENT
Start: 2020-03-11 | End: 2020-03-15 | Stop reason: HOSPADM

## 2020-03-11 RX ADMIN — ALPRAZOLAM 0.5 MG: 0.5 TABLET ORAL at 08:33

## 2020-03-11 RX ADMIN — BACLOFEN 10 MG: 10 TABLET ORAL at 08:34

## 2020-03-11 RX ADMIN — OXYCODONE HYDROCHLORIDE AND ACETAMINOPHEN 1 TABLET: 5; 325 TABLET ORAL at 08:34

## 2020-03-11 RX ADMIN — ACETAMINOPHEN 650 MG: 325 TABLET, FILM COATED ORAL at 15:33

## 2020-03-11 RX ADMIN — FAMOTIDINE 40 MG: 40 TABLET, FILM COATED ORAL at 21:26

## 2020-03-11 RX ADMIN — DESVENLAFAXINE SUCCINATE 50 MG: 50 TABLET, EXTENDED RELEASE ORAL at 08:33

## 2020-03-11 RX ADMIN — NYSTATIN: 100000 POWDER TOPICAL at 08:34

## 2020-03-11 RX ADMIN — OXYCODONE HYDROCHLORIDE AND ACETAMINOPHEN 1 TABLET: 5; 325 TABLET ORAL at 15:34

## 2020-03-11 RX ADMIN — BACLOFEN 10 MG: 10 TABLET ORAL at 15:34

## 2020-03-11 RX ADMIN — OXYCODONE HYDROCHLORIDE AND ACETAMINOPHEN 1 TABLET: 5; 325 TABLET ORAL at 21:27

## 2020-03-11 RX ADMIN — BACLOFEN 10 MG: 10 TABLET ORAL at 21:26

## 2020-03-11 RX ADMIN — ALPRAZOLAM 0.5 MG: 0.5 TABLET ORAL at 15:34

## 2020-03-11 RX ADMIN — ALPRAZOLAM 1 MG: 1 TABLET ORAL at 21:26

## 2020-03-11 RX ADMIN — LISINOPRIL: 10 TABLET ORAL at 08:33

## 2020-03-11 RX ADMIN — PRAZOSIN HYDROCHLORIDE 5 MG: 5 CAPSULE ORAL at 21:26

## 2020-03-11 RX ADMIN — MUPIROCIN: 20 OINTMENT TOPICAL at 08:34

## 2020-03-11 RX ADMIN — NYSTATIN: 100000 POWDER TOPICAL at 21:25

## 2020-03-11 RX ADMIN — MUPIROCIN: 20 OINTMENT TOPICAL at 21:25

## 2020-03-11 NOTE — PROGRESS NOTES
The patient continues to complain of severe anxiety.  She is requesting an increase in her Xanax dose.  I explained the patient that she is already on a fairly aggressive dose of this medication but I will go ahead and increase her Xanax dose 2.5 mg 3 times daily and 1 mg at bedtime.  She continues to express hopelessness and thoughts of suicide.

## 2020-03-11 NOTE — PLAN OF CARE
Problem: Patient Care Overview  Goal: Plan of Care Review  Outcome: Ongoing (interventions implemented as appropriate)  Flowsheets (Taken 3/11/2020 0328)  Progress: improving  Plan of Care Reviewed With: patient  Patient Agreement with Plan of Care: agrees  Note:   Pt compliant with medications and care but continue to have a flat affect and to be teary. Pt denied SI, HI, and hallucinations. Pt rated anxiety at 9/10 and depression 10/10. Pt slept all night. Will continue to monitor behaviors, provide support and safe environment.

## 2020-03-11 NOTE — PLAN OF CARE
Problem: Patient Care Overview  Goal: Individualization and Mutuality  Outcome: Ongoing (interventions implemented as appropriate)  Flowsheets (Taken 3/6/2020 1012 by Demetria De Leon CSW)  Patient Personal Strengths: family/social support;stable living environment  Goal: Interprofessional Rounds/Family Conf  Outcome: Ongoing (interventions implemented as appropriate)  Flowsheets (Taken 3/11/2020 1211)  Participants: art therapy; ; nursing; pastoral care; pharmacy; psychiatrist; social work  Summary: Treatment team met to review pt's plan of care. It was determined that pt did not require an ALYX consult due to miscommunication about medication. SW will explore outpt providers. Svcs needed will be assessed ongoing.     Problem: Suicidal Behavior (Adult)  Goal: Suicidal Behavior is Absent/Minimized/Managed  Outcome: Ongoing (interventions implemented as appropriate)  Flowsheets  Taken 3/11/2020 1211 by Nhi Mendoza MSW  Suicidal Behavior Managed/Minimized Time Frame for Action Step (STG): 4 days  Taken 3/11/2020 0328 by Faith Navarrete RN  Suicidal Behavior Managed/Minimized Action Step (STG) Outcome: making progress toward outcome     Patient/Guardian Signature: __________________________________            Psychiatrist Signature: ______________________________________             Therapist Signature: ________________________________________         Nurse Signature: ___________________________________________          Staff Signature: ____________________________________________            Staff Signature: ____________________________________________          Staff Signature: ____________________________________________          Staff Signature:

## 2020-03-11 NOTE — PLAN OF CARE
Problem: Patient Care Overview  Goal: Plan of Care Review  Outcome: Ongoing (interventions implemented as appropriate)  Flowsheets (Taken 3/11/2020 1605)  Progress: no change  Plan of Care Reviewed With: patient  Patient Agreement with Plan of Care: agrees  Outcome Summary: Pt has had no confirmatin that son has left rehab which has been less stressful for her. Pt has attended programming. C/O nightmares early this morning before rising and when she took short nap this afternnon. Not as weepy but continues to be anxious     Problem: Patient Care Overview  Goal: Individualization and Mutuality  Outcome: Ongoing (interventions implemented as appropriate)     Problem: Patient Care Overview  Goal: Discharge Needs Assessment  Outcome: Ongoing (interventions implemented as appropriate)     Problem: Patient Care Overview  Goal: Interprofessional Rounds/Family Conf  Outcome: Ongoing (interventions implemented as appropriate)     Problem: Overarching Goals (Adult)  Goal: Adheres to Safety Considerations for Self and Others  Outcome: Ongoing (interventions implemented as appropriate)  Flowsheets (Taken 3/11/2020 1605)  Adheres to Safety Considerations for Self and Others: making progress toward outcome     Problem: Overarching Goals (Adult)  Goal: Optimized Coping Skills in Response to Life Stressors  Outcome: Ongoing (interventions implemented as appropriate)  Flowsheets (Taken 3/11/2020 1605)  Optimized Coping Skills in Response to Life Stressors: making progress toward outcome     Problem: Overarching Goals (Adult)  Goal: Develops/Participates in Therapeutic [place] to Support Successful Transition  Outcome: Ongoing (interventions implemented as appropriate)  Flowsheets (Taken 3/11/2020 1605)  Develops/Participates in Therapeutic [place] to Support Successful Transition: making progress toward outcome     Problem: Suicidal Behavior (Adult)  Goal: Suicidal Behavior is Absent/Minimized/Managed  Outcome: Ongoing  (interventions implemented as appropriate)  Flowsheets  Taken 3/11/2020 1605 by Sara Gaona, RN  Action Step/Short Term Goal (STG) Established: 03/10/20  Suicidal Behavior Managed/Minimized Time Frame for Action Step (STG): 3 days  Taken 3/11/2020 0328 by Faith Navarrete, RN  Suicidal Behavior Managed/Minimized Action Step (STG) Outcome: making progress toward outcome     Problem: Pain, Chronic (Adult)  Goal: Identify Related Risk Factors and Signs and Symptoms  Outcome: Ongoing (interventions implemented as appropriate)     Problem: Pain, Chronic (Adult)  Goal: Acceptable Pain/Comfort Level and Functional Ability  Outcome: Ongoing (interventions implemented as appropriate)  Flowsheets (Taken 3/11/2020 1605)  Acceptable Pain/Comfort Level and Functional Ability: making progress toward outcome

## 2020-03-12 RX ADMIN — DESVENLAFAXINE SUCCINATE 50 MG: 50 TABLET, EXTENDED RELEASE ORAL at 09:11

## 2020-03-12 RX ADMIN — OXYCODONE HYDROCHLORIDE AND ACETAMINOPHEN 1 TABLET: 5; 325 TABLET ORAL at 09:11

## 2020-03-12 RX ADMIN — LISINOPRIL: 10 TABLET ORAL at 09:11

## 2020-03-12 RX ADMIN — FAMOTIDINE 40 MG: 40 TABLET, FILM COATED ORAL at 20:29

## 2020-03-12 RX ADMIN — MUPIROCIN: 20 OINTMENT TOPICAL at 09:10

## 2020-03-12 RX ADMIN — OXYCODONE HYDROCHLORIDE AND ACETAMINOPHEN 1 TABLET: 5; 325 TABLET ORAL at 16:22

## 2020-03-12 RX ADMIN — BACLOFEN 10 MG: 10 TABLET ORAL at 16:22

## 2020-03-12 RX ADMIN — BACLOFEN 10 MG: 10 TABLET ORAL at 09:11

## 2020-03-12 RX ADMIN — ALPRAZOLAM 0.5 MG: 0.5 TABLET ORAL at 12:04

## 2020-03-12 RX ADMIN — ALPRAZOLAM 0.5 MG: 0.5 TABLET ORAL at 16:22

## 2020-03-12 RX ADMIN — MUPIROCIN: 20 OINTMENT TOPICAL at 20:29

## 2020-03-12 RX ADMIN — NYSTATIN: 100000 POWDER TOPICAL at 20:29

## 2020-03-12 RX ADMIN — BACLOFEN 10 MG: 10 TABLET ORAL at 20:29

## 2020-03-12 RX ADMIN — ALPRAZOLAM 0.5 MG: 0.5 TABLET ORAL at 09:10

## 2020-03-12 RX ADMIN — PRAZOSIN HYDROCHLORIDE 5 MG: 5 CAPSULE ORAL at 20:29

## 2020-03-12 RX ADMIN — ALPRAZOLAM 1 MG: 1 TABLET ORAL at 20:29

## 2020-03-12 RX ADMIN — OXYCODONE HYDROCHLORIDE AND ACETAMINOPHEN 1 TABLET: 5; 325 TABLET ORAL at 20:29

## 2020-03-12 RX ADMIN — NYSTATIN: 100000 POWDER TOPICAL at 09:10

## 2020-03-12 NOTE — PROGRESS NOTES
The patient offers no new complaints today she remains active within therapeutic milieu but continues to complain of significant anxiety and continues to feel unsafe outside the hospital.  Realistic expectations of inpatient care are discussed with the patient today.  Patient requested increase in her Pristiq dose, but I have explained to her that we need to allow this medication to exert its clinical effect over the next 3 and half to 4 weeks prior to making any decisions regarding a dosage increase.

## 2020-03-12 NOTE — PLAN OF CARE
Problem: Patient Care Overview  Goal: Plan of Care Review  Outcome: Ongoing (interventions implemented as appropriate)  Flowsheets (Taken 3/12/2020 0401)  Progress: improving  Plan of Care Reviewed With: patient  Patient Agreement with Plan of Care: agrees  Note:   Pt was pleasant today, Pt compliant with medications and care. Pt slept all night. Will continue to monitor behaviors, provide support and safe environment.

## 2020-03-13 VITALS
OXYGEN SATURATION: 94 % | RESPIRATION RATE: 18 BRPM | DIASTOLIC BLOOD PRESSURE: 67 MMHG | TEMPERATURE: 97 F | SYSTOLIC BLOOD PRESSURE: 104 MMHG | HEART RATE: 87 BPM

## 2020-03-13 LAB
B-HCG UR QL: NEGATIVE
BACTERIA UR QL AUTO: ABNORMAL /HPF
BILIRUB UR QL STRIP: NEGATIVE
CLARITY UR: ABNORMAL
COLOR UR: YELLOW
GLUCOSE UR STRIP-MCNC: NEGATIVE MG/DL
HGB UR QL STRIP.AUTO: NEGATIVE
HYALINE CASTS UR QL AUTO: ABNORMAL /LPF
KETONES UR QL STRIP: NEGATIVE
LEUKOCYTE ESTERASE UR QL STRIP.AUTO: ABNORMAL
NITRITE UR QL STRIP: NEGATIVE
PH UR STRIP.AUTO: 6.5 [PH] (ref 5–8)
PROT UR QL STRIP: NEGATIVE
RBC # UR: ABNORMAL /HPF
REF LAB TEST METHOD: ABNORMAL
SP GR UR STRIP: 1.02 (ref 1–1.03)
SQUAMOUS #/AREA URNS HPF: ABNORMAL /HPF
UROBILINOGEN UR QL STRIP: ABNORMAL
WBC UR QL AUTO: ABNORMAL /HPF

## 2020-03-13 PROCEDURE — 87086 URINE CULTURE/COLONY COUNT: CPT | Performed by: INTERNAL MEDICINE

## 2020-03-13 PROCEDURE — 81001 URINALYSIS AUTO W/SCOPE: CPT | Performed by: INTERNAL MEDICINE

## 2020-03-13 PROCEDURE — 81025 URINE PREGNANCY TEST: CPT | Performed by: INTERNAL MEDICINE

## 2020-03-13 RX ADMIN — ALPRAZOLAM 1 MG: 1 TABLET ORAL at 21:09

## 2020-03-13 RX ADMIN — LISINOPRIL: 10 TABLET ORAL at 08:50

## 2020-03-13 RX ADMIN — ALPRAZOLAM 0.5 MG: 0.5 TABLET ORAL at 14:16

## 2020-03-13 RX ADMIN — ALPRAZOLAM 0.5 MG: 0.5 TABLET ORAL at 18:54

## 2020-03-13 RX ADMIN — BACLOFEN 10 MG: 10 TABLET ORAL at 16:27

## 2020-03-13 RX ADMIN — NYSTATIN: 100000 POWDER TOPICAL at 21:10

## 2020-03-13 RX ADMIN — DESVENLAFAXINE SUCCINATE 50 MG: 50 TABLET, EXTENDED RELEASE ORAL at 08:51

## 2020-03-13 RX ADMIN — BACLOFEN 10 MG: 10 TABLET ORAL at 21:10

## 2020-03-13 RX ADMIN — ACETAMINOPHEN 650 MG: 325 TABLET, FILM COATED ORAL at 11:00

## 2020-03-13 RX ADMIN — OXYCODONE HYDROCHLORIDE AND ACETAMINOPHEN 1 TABLET: 5; 325 TABLET ORAL at 21:10

## 2020-03-13 RX ADMIN — PRAZOSIN HYDROCHLORIDE 5 MG: 5 CAPSULE ORAL at 21:10

## 2020-03-13 RX ADMIN — FAMOTIDINE 40 MG: 40 TABLET, FILM COATED ORAL at 21:10

## 2020-03-13 RX ADMIN — ALPRAZOLAM 0.5 MG: 0.5 TABLET ORAL at 08:50

## 2020-03-13 RX ADMIN — OXYCODONE HYDROCHLORIDE AND ACETAMINOPHEN 1 TABLET: 5; 325 TABLET ORAL at 15:50

## 2020-03-13 RX ADMIN — BACLOFEN 10 MG: 10 TABLET ORAL at 08:50

## 2020-03-13 RX ADMIN — OXYCODONE HYDROCHLORIDE AND ACETAMINOPHEN 1 TABLET: 5; 325 TABLET ORAL at 08:50

## 2020-03-13 NOTE — PLAN OF CARE
Problem: Patient Care Overview  Goal: Plan of Care Review  Outcome: Ongoing (interventions implemented as appropriate)  Flowsheets (Taken 3/13/2020 1529)  Progress: no change  Plan of Care Reviewed With: patient  Patient Agreement with Plan of Care: agrees  Outcome Summary: Pt somatic today. C/o abdominal pain. Pt states felicitas motley a gastric ulcer and it flares up sometimes. Pt stated she had a BM. No bleeding No emesis. Pt has attended some of the programming. Remains sad and flat. Denies SI     Problem: Patient Care Overview  Goal: Individualization and Mutuality  Outcome: Ongoing (interventions implemented as appropriate)     Problem: Patient Care Overview  Goal: Discharge Needs Assessment  Outcome: Ongoing (interventions implemented as appropriate)     Problem: Patient Care Overview  Goal: Interprofessional Rounds/Family Conf  Outcome: Ongoing (interventions implemented as appropriate)     Problem: Overarching Goals (Adult)  Goal: Adheres to Safety Considerations for Self and Others  Outcome: Ongoing (interventions implemented as appropriate)  Flowsheets (Taken 3/13/2020 152)  Adheres to Safety Considerations for Self and Others: making progress toward outcome     Problem: Overarching Goals (Adult)  Goal: Develops/Participates in Therapeutic [place] to Support Successful Transition  Outcome: Ongoing (interventions implemented as appropriate)  Flowsheets (Taken 3/13/2020 1524)  Develops/Participates in Therapeutic [place] to Support Successful Transition: making progress toward outcome     Problem: Suicidal Behavior (Adult)  Goal: Suicidal Behavior is Absent/Minimized/Managed  Outcome: Ongoing (interventions implemented as appropriate)  Flowsheets (Taken 3/13/2020 1524)  Action Step/Short Term Goal (STG) Established: 3/12/2020  Suicidal Behavior Managed/Minimized Time Frame for Action Step (STG): 3 days  Suicidal Behavior Managed/Minimized Action Step (STG) Outcome: making progress toward outcome

## 2020-03-13 NOTE — PLAN OF CARE
Pt present out in milieu, engages well with peers. Pt did not present anxious or depressed, and did not rate. Pt denied thoughts to hurt self or others. Will continue to provide feedback and support.    Problem: Patient Care Overview  Goal: Plan of Care Review  Outcome: Ongoing (interventions implemented as appropriate)  Flowsheets (Taken 3/13/2020 0340)  Progress: improving  Plan of Care Reviewed With: patient  Patient Agreement with Plan of Care: agrees  Goal: Individualization and Mutuality  Outcome: Ongoing (interventions implemented as appropriate)  Goal: Discharge Needs Assessment  Outcome: Ongoing (interventions implemented as appropriate)  Goal: Interprofessional Rounds/Family Conf  Outcome: Ongoing (interventions implemented as appropriate)     Problem: Overarching Goals (Adult)  Goal: Adheres to Safety Considerations for Self and Others  Outcome: Ongoing (interventions implemented as appropriate)  Flowsheets (Taken 3/13/2020 0340)  Adheres to Safety Considerations for Self and Others: making progress toward outcome  Goal: Optimized Coping Skills in Response to Life Stressors  Outcome: Ongoing (interventions implemented as appropriate)  Flowsheets (Taken 3/13/2020 0340)  Optimized Coping Skills in Response to Life Stressors: making progress toward outcome  Goal: Develops/Participates in Therapeutic [place] to Support Successful Transition  Outcome: Ongoing (interventions implemented as appropriate)  Flowsheets (Taken 3/13/2020 0340)  Develops/Participates in Therapeutic [place] to Support Successful Transition: making progress toward outcome     Problem: Suicidal Behavior (Adult)  Goal: Suicidal Behavior is Absent/Minimized/Managed  Outcome: Ongoing (interventions implemented as appropriate)  Flowsheets (Taken 3/13/2020 0340)  Suicidal Behavior Managed/Minimized Action Step (STG) Outcome: making progress toward outcome     Problem: Pain, Chronic (Adult)  Goal: Identify Related Risk Factors and Signs and  Symptoms  Outcome: Ongoing (interventions implemented as appropriate)  Flowsheets (Taken 3/12/2020 0401 by Faith Navarrete RN)  Related Risk Factors (Chronic Pain): pain control inadequate  Signs and Symptoms (Chronic Pain): sleep pattern change  Goal: Acceptable Pain/Comfort Level and Functional Ability  Outcome: Ongoing (interventions implemented as appropriate)  Flowsheets (Taken 3/13/2020 0340)  Acceptable Pain/Comfort Level and Functional Ability: making progress toward outcome

## 2020-03-13 NOTE — PROGRESS NOTES
The patient is a 45-year-old white female admitted with increasing depression anxiety and suicidal ideation.  She has recently begun a trial of Pristiq.    The patient continues to complain of depressed mood and anxiety.  She states that an extra PRN dose of Xanax was helpful in dealing with her anxiety symptoms yesterday.  We expect her to remain in the hospital through the weekend with early week discharge.

## 2020-03-13 NOTE — PROGRESS NOTES
Name: Jesusita San ADMIT: 3/6/2020   : 1975  PCP: Provider, No Known    MRN: [id number] LOS: 7 days   AGE/SEX: 45 y.o. female  ROOM[facility]     Subjective   Subjective   Patient was seen by internal medicine service on 3/6/2024 management of medical problems and the patient was admitted with severe depression and anxiety.  At that time as she was stable.  She does have a past history of uterine cancer status post partial hysterectomy / hypertension/peptic ulcer disease status post GI bleed/history of chronic renal insufficiency/status post appendectomy.  At that time she was deemed to be medically stable with the medication and to continue the same.  Today we were called back to reevaluate her as she started to have increasing central abdominal pain that is constant without any specific radiation she did notice a bulge in that area.  There was no fever chills no nausea no vomiting no diarrhea no constipation no bleeding per rectum and no melena.  She continues with night bed wetting.  She does complain of frequency and urgency but no dysuria or hematuria.  No vaginal discharge or bleed.    Review of system.  Cardiovascular/respiratory.  No chest pain no palpitation no shortness of breath no paroxysmal nocturnal dyspnea no orthopnea no ankle edema.     Objective   Objective   Vital Signs  Temp:  [97 °F (36.1 °C)] 97 °F (36.1 °C)  Heart Rate:  [87] 87  Resp:  [18] 18  BP: (104)/(67) 104/67  SpO2:  [94 %] 94 %  on   ;   Device (Oxygen Therapy): room air    Intake/Output Summary (Last 24 hours) at 3/13/2020 1958  Last data filed at 3/13/2020 1731  Gross per 24 hour   Intake 840 ml   Output --   Net 840 ml     There is no height or weight on file to calculate BMI.  There were no vitals filed for this visit.  Physical Exam   Constitutional: She appears well-developed and well-nourished.   Obese.   HENT:   Head: Normocephalic and atraumatic.   Mouth/Throat: Oropharynx is clear and moist.   Eyes: Pupils are  equal, round, and reactive to light. Conjunctivae and EOM are normal.   Neck: Normal range of motion. Neck supple. No JVD present. No thyromegaly present.   Cardiovascular: Normal rate, regular rhythm and normal heart sounds.   Pulmonary/Chest: Effort normal and breath sounds normal. No respiratory distress. She has no wheezes. She has no rales.   Abdominal: Soft. Bowel sounds are normal. She exhibits no distension and no mass. There is no rebound and no guarding.   There are scars of a laparoscopic hysterectomy.  Above the umbilicus and underneath 1 of those scars there is a 4 cm x 5 cm round tender swelling that is partially reducible with positive cough impulse.   Genitourinary:   Genitourinary Comments: No CVA tenderness.   Musculoskeletal: She exhibits no edema.   Lymphadenopathy:     She has no cervical adenopathy.   Skin: Skin is warm and dry.   Nursing note and vitals reviewed.      Results Review:          Estimated Creatinine Clearance: 130.6 mL/min (by C-G formula based on SCr of 0.67 mg/dL).                            Invalid input(s):  PHOS        Invalid input(s): LDLCALC                                            Imaging:  Imaging Results (Last 24 Hours)     ** No results found for the last 24 hours. **             I reviewed the patient's new clinical results / labs / tests / procedures      Assessment/Plan     Active Hospital Problems    Diagnosis  POA   • **Major depression [F32.9]  Yes   • Yeast infection of the skin [B37.2]  Unknown   • Multiple abrasions [T07.XXXA]  Unknown   • CKD (chronic kidney disease) [N18.9]  Unknown   • Hypertension [I10]  Unknown   • Bed wetting [N39.44]  Unknown   • Chronic anemia [D64.9]  Unknown   • Cervical cancer (CMS/HCC) [C53.9]  Yes   • Chronic back pain [M54.9, G89.29]  Yes   • GERD without esophagitis [K21.9]  Yes      Resolved Hospital Problems   No resolved problems to display.           · Central abdominal pain most likely secondary to partially incarcerated  supraumbilical incisional hernia.  Patient has no signs of bowel obstruction or sepsis.  Will check CBC/ CMP/ lipase /urinalysis/ urine pregnancy and a CT scan of the abdomen and pelvis.  Will consult surgery.  · History of peptic ulcer disease/GERD/history of GI bleed.  This appears to be clinically stable with no active GI bleed.  I do not believe that peptic ulcer disease is the cause of her abdominal discomfort.  Currently on Pepcid will continue the same and check CBC.  · Hypertension.  Clinically stable under good control while on lisinopril hydrochlorothiazide.  No evidence of angina or congestive heart failure.  Continue the same.  · History of chronic renal failure.  Patient appears to be euvolemic asymptomatic except nocturnal wet bedding.  Will check urine analysis and basic metabolic profile.  · History of uterine and cervical cancer status post partial hysterectomy.  I do not believe that this is the patient because of abdominal pain.  Will check CBC/ CMP and CT scan of the abdomen to rule out metastasis.      I have discussed above diagnosis and plan of treatment with the patient.  Thank you very much for requesting our opinion again we will follow-up with you.        Enrike Valencia MD  [facility]  03/13/20  19:58

## 2020-03-14 ENCOUNTER — APPOINTMENT (OUTPATIENT)
Dept: CT IMAGING | Facility: HOSPITAL | Age: 45
End: 2020-03-14

## 2020-03-14 PROBLEM — K42.0 UMBILICAL HERNIA, INCARCERATED: Status: ACTIVE | Noted: 2020-03-14

## 2020-03-14 PROBLEM — R10.9 ABDOMINAL PAIN: Status: ACTIVE | Noted: 2020-03-14

## 2020-03-14 PROBLEM — K30 DELAYED GASTRIC EMPTYING: Status: ACTIVE | Noted: 2020-03-14

## 2020-03-14 LAB
ABO GROUP BLD: NORMAL
ALBUMIN SERPL-MCNC: 4.1 G/DL (ref 3.5–5.2)
ALBUMIN/GLOB SERPL: 1.7 G/DL
ALP SERPL-CCNC: 85 U/L (ref 39–117)
ALT SERPL W P-5'-P-CCNC: 9 U/L (ref 1–33)
ANION GAP SERPL CALCULATED.3IONS-SCNC: 11.6 MMOL/L (ref 5–15)
AST SERPL-CCNC: 9 U/L (ref 1–32)
BACTERIA SPEC AEROBE CULT: NO GROWTH
BASOPHILS # BLD AUTO: 0.05 10*3/MM3 (ref 0–0.2)
BASOPHILS NFR BLD AUTO: 1 % (ref 0–1.5)
BILIRUB SERPL-MCNC: 0.6 MG/DL (ref 0.2–1.2)
BLD GP AB SCN SERPL QL: NEGATIVE
BUN BLD-MCNC: 10 MG/DL (ref 6–20)
BUN/CREAT SERPL: 17.5 (ref 7–25)
CALCIUM SPEC-SCNC: 9.4 MG/DL (ref 8.6–10.5)
CHLORIDE SERPL-SCNC: 100 MMOL/L (ref 98–107)
CO2 SERPL-SCNC: 26.4 MMOL/L (ref 22–29)
CREAT BLD-MCNC: 0.57 MG/DL (ref 0.57–1)
DEPRECATED RDW RBC AUTO: 45.5 FL (ref 37–54)
EOSINOPHIL # BLD AUTO: 0.27 10*3/MM3 (ref 0–0.4)
EOSINOPHIL NFR BLD AUTO: 5.4 % (ref 0.3–6.2)
ERYTHROCYTE [DISTWIDTH] IN BLOOD BY AUTOMATED COUNT: 15.9 % (ref 12.3–15.4)
GFR SERPL CREATININE-BSD FRML MDRD: 115 ML/MIN/1.73
GLOBULIN UR ELPH-MCNC: 2.4 GM/DL
GLUCOSE BLD-MCNC: 98 MG/DL (ref 65–99)
HCT VFR BLD AUTO: 35.1 % (ref 34–46.6)
HGB BLD-MCNC: 10.9 G/DL (ref 12–15.9)
IMM GRANULOCYTES # BLD AUTO: 0.01 10*3/MM3 (ref 0–0.05)
IMM GRANULOCYTES NFR BLD AUTO: 0.2 % (ref 0–0.5)
LIPASE SERPL-CCNC: 19 U/L (ref 13–60)
LYMPHOCYTES # BLD AUTO: 1.95 10*3/MM3 (ref 0.7–3.1)
LYMPHOCYTES NFR BLD AUTO: 39.2 % (ref 19.6–45.3)
MCH RBC QN AUTO: 24.9 PG (ref 26.6–33)
MCHC RBC AUTO-ENTMCNC: 31.1 G/DL (ref 31.5–35.7)
MCV RBC AUTO: 80.1 FL (ref 79–97)
MONOCYTES # BLD AUTO: 0.39 10*3/MM3 (ref 0.1–0.9)
MONOCYTES NFR BLD AUTO: 7.8 % (ref 5–12)
NEUTROPHILS # BLD AUTO: 2.31 10*3/MM3 (ref 1.7–7)
NEUTROPHILS NFR BLD AUTO: 46.4 % (ref 42.7–76)
NRBC BLD AUTO-RTO: 0 /100 WBC (ref 0–0.2)
PLATELET # BLD AUTO: 253 10*3/MM3 (ref 140–450)
PMV BLD AUTO: 10.4 FL (ref 6–12)
POTASSIUM BLD-SCNC: 3.7 MMOL/L (ref 3.5–5.2)
PROT SERPL-MCNC: 6.5 G/DL (ref 6–8.5)
RBC # BLD AUTO: 4.38 10*6/MM3 (ref 3.77–5.28)
RH BLD: POSITIVE
SODIUM BLD-SCNC: 138 MMOL/L (ref 136–145)
T&S EXPIRATION DATE: NORMAL
WBC NRBC COR # BLD: 4.98 10*3/MM3 (ref 3.4–10.8)

## 2020-03-14 PROCEDURE — 80053 COMPREHEN METABOLIC PANEL: CPT | Performed by: INTERNAL MEDICINE

## 2020-03-14 PROCEDURE — 86901 BLOOD TYPING SEROLOGIC RH(D): CPT | Performed by: SURGERY

## 2020-03-14 PROCEDURE — 0 DIATRIZOATE MEGLUMINE & SODIUM PER 1 ML: Performed by: SURGERY

## 2020-03-14 PROCEDURE — 74177 CT ABD & PELVIS W/CONTRAST: CPT

## 2020-03-14 PROCEDURE — 86900 BLOOD TYPING SEROLOGIC ABO: CPT | Performed by: SURGERY

## 2020-03-14 PROCEDURE — 25010000002 IOPAMIDOL 61 % SOLUTION: Performed by: SPECIALIST

## 2020-03-14 PROCEDURE — 83690 ASSAY OF LIPASE: CPT | Performed by: INTERNAL MEDICINE

## 2020-03-14 PROCEDURE — 99222 1ST HOSP IP/OBS MODERATE 55: CPT | Performed by: SURGERY

## 2020-03-14 PROCEDURE — 86850 RBC ANTIBODY SCREEN: CPT | Performed by: SURGERY

## 2020-03-14 PROCEDURE — 85025 COMPLETE CBC W/AUTO DIFF WBC: CPT | Performed by: INTERNAL MEDICINE

## 2020-03-14 RX ORDER — OXYCODONE HCL 10 MG/1
20 TABLET, FILM COATED, EXTENDED RELEASE ORAL ONCE
Status: CANCELLED | OUTPATIENT
Start: 2020-03-15 | End: 2020-03-14

## 2020-03-14 RX ORDER — ONDANSETRON 2 MG/ML
4 INJECTION INTRAMUSCULAR; INTRAVENOUS EVERY 6 HOURS PRN
Status: CANCELLED | OUTPATIENT
Start: 2020-03-14

## 2020-03-14 RX ORDER — ACETAMINOPHEN 500 MG
1000 TABLET ORAL ONCE
Status: CANCELLED | OUTPATIENT
Start: 2020-03-15 | End: 2020-03-14

## 2020-03-14 RX ORDER — SODIUM CHLORIDE 0.9 % (FLUSH) 0.9 %
10 SYRINGE (ML) INJECTION AS NEEDED
Status: DISCONTINUED | OUTPATIENT
Start: 2020-03-14 | End: 2020-03-15 | Stop reason: HOSPADM

## 2020-03-14 RX ORDER — CEFAZOLIN SODIUM 2 G/100ML
2 INJECTION, SOLUTION INTRAVENOUS ONCE
Status: CANCELLED | OUTPATIENT
Start: 2020-03-15 | End: 2020-03-14

## 2020-03-14 RX ADMIN — IOPAMIDOL 85 ML: 612 INJECTION, SOLUTION INTRAVENOUS at 14:15

## 2020-03-14 RX ADMIN — DESVENLAFAXINE SUCCINATE 50 MG: 50 TABLET, EXTENDED RELEASE ORAL at 08:29

## 2020-03-14 RX ADMIN — BACLOFEN 10 MG: 10 TABLET ORAL at 20:59

## 2020-03-14 RX ADMIN — OXYCODONE HYDROCHLORIDE AND ACETAMINOPHEN 1 TABLET: 5; 325 TABLET ORAL at 08:28

## 2020-03-14 RX ADMIN — PRAZOSIN HYDROCHLORIDE 5 MG: 5 CAPSULE ORAL at 20:59

## 2020-03-14 RX ADMIN — ALPRAZOLAM 0.5 MG: 0.5 TABLET ORAL at 19:14

## 2020-03-14 RX ADMIN — FAMOTIDINE 40 MG: 40 TABLET, FILM COATED ORAL at 20:59

## 2020-03-14 RX ADMIN — BACLOFEN 10 MG: 10 TABLET ORAL at 08:29

## 2020-03-14 RX ADMIN — DIATRIZOATE MEGLUMINE AND DIATRIZOATE SODIUM 30 ML: 600; 100 SOLUTION ORAL; RECTAL at 10:48

## 2020-03-14 RX ADMIN — ALPRAZOLAM 1 MG: 1 TABLET ORAL at 21:00

## 2020-03-14 RX ADMIN — ACETAMINOPHEN 650 MG: 325 TABLET, FILM COATED ORAL at 01:47

## 2020-03-14 RX ADMIN — OXYCODONE HYDROCHLORIDE AND ACETAMINOPHEN 1 TABLET: 5; 325 TABLET ORAL at 21:00

## 2020-03-14 RX ADMIN — ALPRAZOLAM 0.5 MG: 0.5 TABLET ORAL at 08:29

## 2020-03-14 RX ADMIN — ALPRAZOLAM 0.5 MG: 0.5 TABLET ORAL at 14:56

## 2020-03-14 RX ADMIN — MUPIROCIN: 20 OINTMENT TOPICAL at 21:00

## 2020-03-14 RX ADMIN — LISINOPRIL: 10 TABLET ORAL at 08:29

## 2020-03-14 RX ADMIN — OXYCODONE HYDROCHLORIDE AND ACETAMINOPHEN 1 TABLET: 5; 325 TABLET ORAL at 15:00

## 2020-03-14 RX ADMIN — BACLOFEN 10 MG: 10 TABLET ORAL at 15:00

## 2020-03-14 RX ADMIN — NYSTATIN: 100000 POWDER TOPICAL at 21:00

## 2020-03-14 NOTE — PROGRESS NOTES
Patient is seen, evaluated, and chart reviewed. Discussed with staff.  Patient is seen for Dr. Landry.    Staff reports that patient has been cooperative and compliant with medications.  No behavioral issues.    On examination, patient is found laying in bed.  Patient slept poorly last night.  Patient is complaining of abdominal pain.  Mood is depressed and anxious.  Affect congruent.  No SI/HI/AVH.  Thought processes are goal-directed.  Judgment and insight are okay.    No medication side effects.  Patient is provided with supportive therapy.  Abdominal CT scan is pending.  Continue current medications, therapy, and inpatient treatment plan for safety and stabilization.

## 2020-03-14 NOTE — CONSULTS
"SURGERY  Jesusita San   1975      Chief Complaint: Abdominal pain  Date of admission: 3/6/2020   Today's date:  03/14/20    HPI    Ms. San is a nice 45 y.o. female who is in the crisis management unit for depression, who notes that prior to admission about 9 days ago, she was having abdominal pain but not to this extent.  Over the last couple of days she has begun to have severe pain, that prompted an evaluation by the hospitalist.  Their impression was that she had a hernia which was partially reducible.  She is exquisitely tender at that site, and says that it appears to be getting larger.  In addition however she gives a history of having a very distended abdomen, feeling like she looks like she is \"5 months pregnant\" but of course not so.  She also feels like she is having crampy abdominal pain.    I reviewed the CT scan which was just done at [facility], with no formal reading, and there does appear to be a small hernia at the trocar site in the lower epigastric region where her exquisite area of tenderness is, but with only fatty tissue in it.  There is: Immediately adjacent, and there is the chance that this could have just been reduced or that there is an epiploica contained that is tugging on the colon.  In addition she has a small umbilical hernia.  Very notable however is a large amount of material in her stomach, and she has a history of a duodenal ulcer, and I am wondering about some sort of gastric emptying issue.    I looked both in the media section, as well as the procedure and op note section, without any findings of treatment of a duodenal ulcer.  It does look like she has had treatment through Dr. Ivan Good in 2018 at [facility].  Review of that note indicated that the ulcer was actually seen on CT so it must have been pretty impressive.  On her evaluation at [facility] later in 2018, there is a diagnosis of gastroparesis, with a gastric emptying study showing moderate prolongation of gastric " "emptying.  Reviewing the CT scan, her stomach is superior to the area of the greatest tenderness at the hernia.    Complicating issues regarding a potential surgery are that of her chronic depression, chronic pain on oxycodone 7.5, PTSD, and her BMI is 42.9.  She is very anxious to have this repaired prior to leaving the hospital as she does not feel that she can tolerate the pain.    Past Medical History:   Diagnosis Date   • ADD (attention deficit disorder)    • Back pain    • Cervical cancer (CMS/HCC)    • Cervical spinal cord injury (CMS/HCC)    • CKD (chronic kidney disease)    • Depression    • Duodenal ulcer    • GERD (gastroesophageal reflux disease)    • Herpes simplex infection    • Hypertension    • Kidney stones    • PTSD (post-traumatic stress disorder)      Past Surgical History:   Procedure Laterality Date   • ANAL FISSURECTOMY     • APPENDECTOMY     •  SECTION     • COLONOSCOPY     • ENDOSCOPY     • KIDNEY STONE SURGERY     • TONSILLECTOMY AND ADENOIDECTOMY     • TUBAL ABDOMINAL LIGATION     • TYMPANOSTOMY TUBE PLACEMENT     • WISDOM TOOTH EXTRACTION       Family History   Problem Relation Age of Onset   • Heart disease Father          from MI at age 50   • Early death Father    • Hypertension Father    • Colon cancer Maternal Grandfather    • Stroke Maternal Grandfather      Social History     Socioeconomic History   • Marital status: Single     Spouse name: Not on file   • Number of children: Not on file   • Years of education: Not on file   • Highest education level: Not on file   Tobacco Use   • Smoking status: Never Smoker   • Smokeless tobacco: Never Used   Substance and Sexual Activity   • Alcohol use: Yes     Comment: \"socially\"   • Drug use: Not Currently   • Sexual activity: Defer         Current Facility-Administered Medications:   •  acetaminophen (TYLENOL) tablet 650 mg, 650 mg, Oral, Q4H PRN, Deven Landry III, MD, 650 mg at 20 0147  •  ALPRAZolam (XANAX) " tablet 0.5 mg, 0.5 mg, Oral, TID, Deven Landry III, MD, 0.5 mg at 03/14/20 0829  •  ALPRAZolam (XANAX) tablet 1 mg, 1 mg, Oral, Nightly, Deven Landry III, MD, 1 mg at 03/13/20 2109  •  aluminum-magnesium hydroxide-simethicone (MAALOX MAX) 400-400-40 MG/5ML suspension 15 mL, 15 mL, Oral, Q6H PRN, Deven Landry III, MD  •  baclofen (LIORESAL) tablet 10 mg, 10 mg, Oral, TID, Deven Landry III, MD, 10 mg at 03/14/20 0829  •  desvenlafaxine (PRISTIQ) 24 hr tablet 50 mg, 50 mg, Oral, Daily, Deven Landry III, MD, 50 mg at 03/14/20 0829  •  famotidine (PEPCID) tablet 40 mg, 40 mg, Oral, Nightly, Deven Landry III, MD, 40 mg at 03/13/20 2110  •  lisinopril (PRINIVIL,ZESTRIL) 10 mg, hydroCHLOROthiazide (HYDRODIURIL) 12.5 mg for ZESTORETIC 10-12.5, , Oral, Q24H, Annie Leach APRN  •  loperamide (IMODIUM) capsule 2 mg, 2 mg, Oral, Q2H PRN, Deven Landry III, MD  •  magnesium hydroxide (MILK OF MAGNESIA) suspension 2400 mg/10mL 10 mL, 10 mL, Oral, Daily PRN, Deven Landry III, MD  •  mupirocin (BACTROBAN) 2 % ointment, , Topical, Q12H, Annie Leach APRN  •  nystatin (MYCOSTATIN) powder, , Topical, Q12H, Annie Leach APRN  •  oxyCODONE-acetaminophen (PERCOCET) 5-325 MG per tablet 1 tablet, 1 tablet, Oral, TID, Deven Landry III, MD, 1 tablet at 03/14/20 0828  •  prazosin (MINIPRESS) capsule 5 mg, 5 mg, Oral, Nightly, JosetteKemal MD, 5 mg at 03/13/20 2110  •  sodium chloride 0.9 % flush 10 mL, 10 mL, Intravenous, PRN, Re Sheppard MD    Allergies   Allergen Reactions   • Tramadol Unknown - Low Severity     Review of Systems  Absence of fevers, blood in her stool, emesis.  Predominant complaints are pain at the site and in her neck.  All other systems reviewed and negative.    Vitals:    03/11/20 0700 03/12/20 0956 03/12/20 1955 03/13/20 0652   BP: 105/71 146/95 101/65 104/67   BP Location:  Left arm Left arm Right arm Right arm   Patient Position: Lying Sitting Sitting Lying   Pulse: 82 103 77 87   Resp: 16 16 16 18   Temp: 97.4 °F (36.3 °C) 97.2 °F (36.2 °C) 97.1 °F (36.2 °C) 97 °F (36.1 °C)   TempSrc: Oral Oral Oral Oral   SpO2: 99% 95% 98% 94%       PHYSICAL EXAM:    /67 (BP Location: Right arm, Patient Position: Lying)   Pulse 87   Temp 97 °F (36.1 °C) (Oral)   Resp 18   SpO2 94%   There is no height or weight on file to calculate BMI.    Constitutional: well developed, well nourished, appears moderately healthy, located in the crisis unit, excess weight  Eyes: sclera nonicteric, conjunctiva not injected   ENMT: Hearing intact, trachea midline, dentitia in good order  CVS: RRR, no murmur, peripheral edema not present  Respiratory: CTA, normal respiratory effort   Gastrointestinal: no hepatosplenomegaly, abdomen rounded, considerable subcutaneous adipose tissue, abdominal hernia suspected in the lower epigastric region where a prior trocar has been, with what appears to be about a 4 to 5 cm area of subcutaneous fatty tissue, very tender, nonreducible  Genitourinary: inguinal hernia not present  Musculoskeletal: gait normal, muscle mass normal  Skin: warm and dry, no rashes visible  Neurological: awake and alert, seems to have reasonable capacity for understanding for medical decision making  Psychiatric: appears to have reasonable judgement, very pleasant  Lymphatics: no cervical adenopathy    Radiographic/Lab Findings: CT as listed    IMPRESSION:  · Incisional hernia, lower epigastric region, incarcerated, increasingly tender.  While this is not an incredibly large hernia, it is very tender and may include an epiploica of the colon.  It does not contain bowel at this point.  · Gastroparesis versus gastric outlet obstruction from prior duodenal ulcer disease.  This could account for her abdominal distention to some degree.  · BMI 43  · Chronic depression in the crisis management  unit  · [facility]  · Bedwetting  · Chronic kidney disease  · History of self medialization  · Anxiety and depression with history of suicidal ideation  · History of cervical cancer    PLAN:  · I am going to plan to go ahead and take care of this hernia while she is here and I told her that she will need to find a ride to pick her up from the recovery room.  We will not be able to proceed if she does not have a ride set up that we will be able to, and take responsibility for her.  · We will give her 10 tablets of pain medication postoperatively and she can get the remainder from her pain management system.  · She will need an EGD to evaluate this possibility of gastroparesis versus gastric outlet syndrome.  If in fact it has gastric outlet, we may be able to dilate this in get a little relief.  · Discussed with the patient the potential need for mesh since this is an incisional hernia  · Discussed with patient increased perioperative risks associated with obesity including increased risks of DVT, infection, seromas, poor wound healing and hernias (with abdominal surgery).    Re Sheppard MD  Saturday

## 2020-03-14 NOTE — PLAN OF CARE
Patient has been calm and cooperative this shift.  Taking meds, but not feeling well with GI complications like abdominal distension, bloating, and nausea.  Went down for an abd & pelvis CT scan showing a hernia as well as other potential surgical issues.  Plan for surgery tomorrow morning with Dr. Sheppard.  Patient is aware of plan and has been educated on preop orders such as NPO at [facility], wearing SCDs, and having a type and screen drawn.    Problem: Patient Care Overview  Goal: Plan of Care Review  Outcome: Ongoing (interventions implemented as appropriate)  Goal: Individualization and Mutuality  Outcome: Ongoing (interventions implemented as appropriate)  Goal: Discharge Needs Assessment  Outcome: Ongoing (interventions implemented as appropriate)  Goal: Interprofessional Rounds/Family Conf  Outcome: Ongoing (interventions implemented as appropriate)     Problem: Overarching Goals (Adult)  Goal: Adheres to Safety Considerations for Self and Others  Outcome: Ongoing (interventions implemented as appropriate)  Goal: Optimized Coping Skills in Response to Life Stressors  Outcome: Ongoing (interventions implemented as appropriate)  Goal: Develops/Participates in Therapeutic [place] to Support Successful Transition  Outcome: Ongoing (interventions implemented as appropriate)     Problem: Suicidal Behavior (Adult)  Goal: Suicidal Behavior is Absent/Minimized/Managed  Outcome: Ongoing (interventions implemented as appropriate)     Problem: Pain, Chronic (Adult)  Goal: Identify Related Risk Factors and Signs and Symptoms  Outcome: Ongoing (interventions implemented as appropriate)  Goal: Acceptable Pain/Comfort Level and Functional Ability  Outcome: Ongoing (interventions implemented as appropriate)

## 2020-03-15 ENCOUNTER — ANESTHESIA (OUTPATIENT)
Dept: PERIOP | Facility: HOSPITAL | Age: 45
End: 2020-03-15

## 2020-03-15 ENCOUNTER — ANESTHESIA EVENT (OUTPATIENT)
Dept: PERIOP | Facility: HOSPITAL | Age: 45
End: 2020-03-15

## 2020-03-15 ENCOUNTER — HOSPITAL ENCOUNTER (OUTPATIENT)
Facility: HOSPITAL | Age: 45
Setting detail: SURGERY ADMIT
Discharge: HOME | End: 2020-03-15
Attending: SURGERY | Admitting: SURGERY

## 2020-03-15 VITALS
DIASTOLIC BLOOD PRESSURE: 78 MMHG | OXYGEN SATURATION: 92 % | RESPIRATION RATE: 16 BRPM | TEMPERATURE: 98.1 F | HEART RATE: 92 BPM | SYSTOLIC BLOOD PRESSURE: 100 MMHG

## 2020-03-15 DIAGNOSIS — K43.0 INCARCERATED INCISIONAL HERNIA: Primary | ICD-10-CM

## 2020-03-15 PROCEDURE — 25010000003 CEFAZOLIN IN DEXTROSE 2-4 GM/100ML-% SOLUTION: Performed by: SURGERY

## 2020-03-15 PROCEDURE — 49561 PR REPAIR INCISIONAL HERNIA,STRANG: CPT | Performed by: SURGERY

## 2020-03-15 PROCEDURE — 25010000002 FENTANYL CITRATE (PF) 100 MCG/2ML SOLUTION: Performed by: ANESTHESIOLOGY

## 2020-03-15 PROCEDURE — 25010000002 SUCCINYLCHOLINE PER 20 MG: Performed by: NURSE ANESTHETIST, CERTIFIED REGISTERED

## 2020-03-15 PROCEDURE — 25010000002 NEOSTIGMINE PER 0.5 MG: Performed by: NURSE ANESTHETIST, CERTIFIED REGISTERED

## 2020-03-15 PROCEDURE — 25010000002 MIDAZOLAM PER 1 MG: Performed by: ANESTHESIOLOGY

## 2020-03-15 PROCEDURE — 25010000002 HYDROMORPHONE PER 4 MG: Performed by: NURSE ANESTHETIST, CERTIFIED REGISTERED

## 2020-03-15 PROCEDURE — 25010000002 DEXAMETHASONE PER 1 MG: Performed by: NURSE ANESTHETIST, CERTIFIED REGISTERED

## 2020-03-15 PROCEDURE — 25010000002 PROPOFOL 10 MG/ML EMULSION: Performed by: NURSE ANESTHETIST, CERTIFIED REGISTERED

## 2020-03-15 PROCEDURE — 25010000002 ONDANSETRON PER 1 MG: Performed by: NURSE ANESTHETIST, CERTIFIED REGISTERED

## 2020-03-15 PROCEDURE — 25010000002 FENTANYL CITRATE (PF) 100 MCG/2ML SOLUTION: Performed by: NURSE ANESTHETIST, CERTIFIED REGISTERED

## 2020-03-15 PROCEDURE — 25010000002 MIDAZOLAM PER 1 MG: Performed by: NURSE ANESTHETIST, CERTIFIED REGISTERED

## 2020-03-15 RX ORDER — ACETAMINOPHEN 325 MG/1
650 TABLET ORAL ONCE AS NEEDED
Status: DISCONTINUED | OUTPATIENT
Start: 2020-03-15 | End: 2020-03-15 | Stop reason: HOSPADM

## 2020-03-15 RX ORDER — ACETAMINOPHEN 500 MG
1000 TABLET ORAL ONCE
Status: COMPLETED | OUTPATIENT
Start: 2020-03-15 | End: 2020-03-15

## 2020-03-15 RX ORDER — FENTANYL CITRATE 50 UG/ML
INJECTION, SOLUTION INTRAMUSCULAR; INTRAVENOUS AS NEEDED
Status: DISCONTINUED | OUTPATIENT
Start: 2020-03-15 | End: 2020-03-15 | Stop reason: SURG

## 2020-03-15 RX ORDER — ONDANSETRON 2 MG/ML
4 INJECTION INTRAMUSCULAR; INTRAVENOUS ONCE AS NEEDED
Status: DISCONTINUED | OUTPATIENT
Start: 2020-03-15 | End: 2020-03-15 | Stop reason: HOSPADM

## 2020-03-15 RX ORDER — DESVENLAFAXINE SUCCINATE 50 MG/1
50 TABLET, EXTENDED RELEASE ORAL DAILY
COMMUNITY
End: 2020-03-15 | Stop reason: HOSPADM

## 2020-03-15 RX ORDER — PROPOFOL 10 MG/ML
VIAL (ML) INTRAVENOUS AS NEEDED
Status: DISCONTINUED | OUTPATIENT
Start: 2020-03-15 | End: 2020-03-15 | Stop reason: SURG

## 2020-03-15 RX ORDER — HYDROMORPHONE HYDROCHLORIDE 1 MG/ML
0.5 INJECTION, SOLUTION INTRAMUSCULAR; INTRAVENOUS; SUBCUTANEOUS
Status: DISCONTINUED | OUTPATIENT
Start: 2020-03-15 | End: 2020-03-15 | Stop reason: HOSPADM

## 2020-03-15 RX ORDER — LABETALOL HYDROCHLORIDE 5 MG/ML
5 INJECTION, SOLUTION INTRAVENOUS
Status: DISCONTINUED | OUTPATIENT
Start: 2020-03-15 | End: 2020-03-15 | Stop reason: HOSPADM

## 2020-03-15 RX ORDER — SODIUM CHLORIDE 0.9 % (FLUSH) 0.9 %
3 SYRINGE (ML) INJECTION EVERY 12 HOURS SCHEDULED
Status: CANCELLED | OUTPATIENT
Start: 2020-03-15

## 2020-03-15 RX ORDER — FAMOTIDINE 10 MG/ML
20 INJECTION, SOLUTION INTRAVENOUS ONCE
Status: COMPLETED | OUTPATIENT
Start: 2020-03-15 | End: 2020-03-15

## 2020-03-15 RX ORDER — PROMETHAZINE HYDROCHLORIDE 25 MG/ML
12.5 INJECTION, SOLUTION INTRAMUSCULAR; INTRAVENOUS ONCE AS NEEDED
Status: DISCONTINUED | OUTPATIENT
Start: 2020-03-15 | End: 2020-03-15 | Stop reason: HOSPADM

## 2020-03-15 RX ORDER — SODIUM CHLORIDE 0.9 % (FLUSH) 0.9 %
3-10 SYRINGE (ML) INJECTION AS NEEDED
Status: CANCELLED | OUTPATIENT
Start: 2020-03-15

## 2020-03-15 RX ORDER — ONDANSETRON 2 MG/ML
INJECTION INTRAMUSCULAR; INTRAVENOUS AS NEEDED
Status: DISCONTINUED | OUTPATIENT
Start: 2020-03-15 | End: 2020-03-15 | Stop reason: SURG

## 2020-03-15 RX ORDER — MIDAZOLAM HYDROCHLORIDE 1 MG/ML
2 INJECTION INTRAMUSCULAR; INTRAVENOUS
Status: CANCELLED | OUTPATIENT
Start: 2020-03-15

## 2020-03-15 RX ORDER — SODIUM CHLORIDE 0.9 % (FLUSH) 0.9 %
3 SYRINGE (ML) INJECTION EVERY 12 HOURS SCHEDULED
Status: DISCONTINUED | OUTPATIENT
Start: 2020-03-15 | End: 2020-03-15 | Stop reason: HOSPADM

## 2020-03-15 RX ORDER — DESVENLAFAXINE SUCCINATE 50 MG/1
50 TABLET, EXTENDED RELEASE ORAL DAILY
Qty: 7 TABLET | Refills: 0 | OUTPATIENT
Start: 2020-03-15 | End: 2020-10-02 | Stop reason: HOSPADM

## 2020-03-15 RX ORDER — DIPHENHYDRAMINE HCL 25 MG
25 CAPSULE ORAL
Status: DISCONTINUED | OUTPATIENT
Start: 2020-03-15 | End: 2020-03-15 | Stop reason: HOSPADM

## 2020-03-15 RX ORDER — FAMOTIDINE 10 MG/ML
20 INJECTION, SOLUTION INTRAVENOUS ONCE
Status: CANCELLED | OUTPATIENT
Start: 2020-03-15 | End: 2020-03-15

## 2020-03-15 RX ORDER — PROMETHAZINE HYDROCHLORIDE 25 MG/1
25 SUPPOSITORY RECTAL ONCE AS NEEDED
Status: DISCONTINUED | OUTPATIENT
Start: 2020-03-15 | End: 2020-03-15 | Stop reason: HOSPADM

## 2020-03-15 RX ORDER — EPHEDRINE SULFATE 50 MG/ML
5 INJECTION, SOLUTION INTRAVENOUS ONCE AS NEEDED
Status: DISCONTINUED | OUTPATIENT
Start: 2020-03-15 | End: 2020-03-15 | Stop reason: HOSPADM

## 2020-03-15 RX ORDER — PROMETHAZINE HYDROCHLORIDE 25 MG/1
25 TABLET ORAL ONCE AS NEEDED
Status: DISCONTINUED | OUTPATIENT
Start: 2020-03-15 | End: 2020-03-15 | Stop reason: HOSPADM

## 2020-03-15 RX ORDER — DEXAMETHASONE SODIUM PHOSPHATE 10 MG/ML
INJECTION INTRAMUSCULAR; INTRAVENOUS AS NEEDED
Status: DISCONTINUED | OUTPATIENT
Start: 2020-03-15 | End: 2020-03-15 | Stop reason: SURG

## 2020-03-15 RX ORDER — OXYCODONE HCL 10 MG/1
20 TABLET, FILM COATED, EXTENDED RELEASE ORAL ONCE
Status: COMPLETED | OUTPATIENT
Start: 2020-03-15 | End: 2020-03-15

## 2020-03-15 RX ORDER — MIDAZOLAM HYDROCHLORIDE 1 MG/ML
1 INJECTION INTRAMUSCULAR; INTRAVENOUS
Status: CANCELLED | OUTPATIENT
Start: 2020-03-15

## 2020-03-15 RX ORDER — SODIUM CHLORIDE, SODIUM LACTATE, POTASSIUM CHLORIDE, CALCIUM CHLORIDE 600; 310; 30; 20 MG/100ML; MG/100ML; MG/100ML; MG/100ML
9 INJECTION, SOLUTION INTRAVENOUS CONTINUOUS
Status: DISCONTINUED | OUTPATIENT
Start: 2020-03-15 | End: 2020-03-15 | Stop reason: HOSPADM

## 2020-03-15 RX ORDER — CEFAZOLIN SODIUM 2 G/100ML
2 INJECTION, SOLUTION INTRAVENOUS ONCE
Status: COMPLETED | OUTPATIENT
Start: 2020-03-15 | End: 2020-03-15

## 2020-03-15 RX ORDER — GLYCOPYRROLATE 0.2 MG/ML
INJECTION INTRAMUSCULAR; INTRAVENOUS AS NEEDED
Status: DISCONTINUED | OUTPATIENT
Start: 2020-03-15 | End: 2020-03-15 | Stop reason: SURG

## 2020-03-15 RX ORDER — SODIUM CHLORIDE, SODIUM LACTATE, POTASSIUM CHLORIDE, CALCIUM CHLORIDE 600; 310; 30; 20 MG/100ML; MG/100ML; MG/100ML; MG/100ML
9 INJECTION, SOLUTION INTRAVENOUS CONTINUOUS
Status: CANCELLED | OUTPATIENT
Start: 2020-03-15

## 2020-03-15 RX ORDER — HYDROCODONE BITARTRATE AND ACETAMINOPHEN 7.5; 325 MG/1; MG/1
1 TABLET ORAL ONCE AS NEEDED
Status: DISCONTINUED | OUTPATIENT
Start: 2020-03-15 | End: 2020-03-15 | Stop reason: HOSPADM

## 2020-03-15 RX ORDER — FENTANYL CITRATE 50 UG/ML
50 INJECTION, SOLUTION INTRAMUSCULAR; INTRAVENOUS
Status: DISCONTINUED | OUTPATIENT
Start: 2020-03-15 | End: 2020-03-15 | Stop reason: HOSPADM

## 2020-03-15 RX ORDER — MIDAZOLAM HYDROCHLORIDE 1 MG/ML
1 INJECTION INTRAMUSCULAR; INTRAVENOUS
Status: DISCONTINUED | OUTPATIENT
Start: 2020-03-15 | End: 2020-03-15 | Stop reason: HOSPADM

## 2020-03-15 RX ORDER — OXYCODONE AND ACETAMINOPHEN 10; 325 MG/1; MG/1
1 TABLET ORAL EVERY 6 HOURS PRN
Qty: 10 TABLET | Refills: 0 | Status: SHIPPED | OUTPATIENT
Start: 2020-03-15 | End: 2020-03-25

## 2020-03-15 RX ORDER — MAGNESIUM HYDROXIDE 1200 MG/15ML
LIQUID ORAL AS NEEDED
Status: DISCONTINUED | OUTPATIENT
Start: 2020-03-15 | End: 2020-03-15 | Stop reason: HOSPADM

## 2020-03-15 RX ORDER — HYDROMORPHONE HCL 110MG/55ML
PATIENT CONTROLLED ANALGESIA SYRINGE INTRAVENOUS AS NEEDED
Status: DISCONTINUED | OUTPATIENT
Start: 2020-03-15 | End: 2020-03-15 | Stop reason: SURG

## 2020-03-15 RX ORDER — PROMETHAZINE HYDROCHLORIDE 25 MG/ML
6.25 INJECTION, SOLUTION INTRAMUSCULAR; INTRAVENOUS
Status: DISCONTINUED | OUTPATIENT
Start: 2020-03-15 | End: 2020-03-15 | Stop reason: HOSPADM

## 2020-03-15 RX ORDER — NALOXONE HCL 0.4 MG/ML
0.2 VIAL (ML) INJECTION AS NEEDED
Status: DISCONTINUED | OUTPATIENT
Start: 2020-03-15 | End: 2020-03-15 | Stop reason: HOSPADM

## 2020-03-15 RX ORDER — SUCCINYLCHOLINE CHLORIDE 20 MG/ML
INJECTION INTRAMUSCULAR; INTRAVENOUS AS NEEDED
Status: DISCONTINUED | OUTPATIENT
Start: 2020-03-15 | End: 2020-03-15 | Stop reason: SURG

## 2020-03-15 RX ORDER — FENTANYL CITRATE 50 UG/ML
50 INJECTION, SOLUTION INTRAMUSCULAR; INTRAVENOUS
Status: CANCELLED | OUTPATIENT
Start: 2020-03-15

## 2020-03-15 RX ORDER — ROCURONIUM BROMIDE 10 MG/ML
INJECTION, SOLUTION INTRAVENOUS AS NEEDED
Status: DISCONTINUED | OUTPATIENT
Start: 2020-03-15 | End: 2020-03-15 | Stop reason: SURG

## 2020-03-15 RX ORDER — LIDOCAINE HYDROCHLORIDE 10 MG/ML
0.5 INJECTION, SOLUTION EPIDURAL; INFILTRATION; INTRACAUDAL; PERINEURAL ONCE AS NEEDED
Status: CANCELLED | OUTPATIENT
Start: 2020-03-15

## 2020-03-15 RX ORDER — DIPHENHYDRAMINE HYDROCHLORIDE 50 MG/ML
12.5 INJECTION INTRAMUSCULAR; INTRAVENOUS
Status: DISCONTINUED | OUTPATIENT
Start: 2020-03-15 | End: 2020-03-15 | Stop reason: HOSPADM

## 2020-03-15 RX ORDER — PRAZOSIN HYDROCHLORIDE 5 MG/1
5 CAPSULE ORAL NIGHTLY
Qty: 7 CAPSULE | Refills: 0 | Status: SHIPPED | OUTPATIENT
Start: 2020-03-15 | End: 2020-10-02 | Stop reason: HOSPADM

## 2020-03-15 RX ORDER — LIDOCAINE HYDROCHLORIDE 10 MG/ML
0.5 INJECTION, SOLUTION EPIDURAL; INFILTRATION; INTRACAUDAL; PERINEURAL ONCE AS NEEDED
Status: DISCONTINUED | OUTPATIENT
Start: 2020-03-15 | End: 2020-03-15 | Stop reason: HOSPADM

## 2020-03-15 RX ORDER — ALPRAZOLAM 0.5 MG/1
0.5 TABLET ORAL 3 TIMES DAILY PRN
Qty: 21 TABLET | Refills: 0 | Status: SHIPPED | OUTPATIENT
Start: 2020-03-15

## 2020-03-15 RX ORDER — SODIUM CHLORIDE 0.9 % (FLUSH) 0.9 %
3-10 SYRINGE (ML) INJECTION AS NEEDED
Status: DISCONTINUED | OUTPATIENT
Start: 2020-03-15 | End: 2020-03-15 | Stop reason: HOSPADM

## 2020-03-15 RX ORDER — BUPIVACAINE HYDROCHLORIDE AND EPINEPHRINE 5; 5 MG/ML; UG/ML
INJECTION, SOLUTION PERINEURAL AS NEEDED
Status: DISCONTINUED | OUTPATIENT
Start: 2020-03-15 | End: 2020-03-15 | Stop reason: HOSPADM

## 2020-03-15 RX ORDER — OXYCODONE AND ACETAMINOPHEN 7.5; 325 MG/1; MG/1
1 TABLET ORAL ONCE AS NEEDED
Status: COMPLETED | OUTPATIENT
Start: 2020-03-15 | End: 2020-03-15

## 2020-03-15 RX ORDER — ONDANSETRON 4 MG/1
4 TABLET, FILM COATED ORAL EVERY 8 HOURS PRN
Qty: 10 TABLET | Refills: 0 | Status: ON HOLD | OUTPATIENT
Start: 2020-03-15 | End: 2020-09-25

## 2020-03-15 RX ORDER — HYDRALAZINE HYDROCHLORIDE 20 MG/ML
5 INJECTION INTRAMUSCULAR; INTRAVENOUS
Status: DISCONTINUED | OUTPATIENT
Start: 2020-03-15 | End: 2020-03-15 | Stop reason: HOSPADM

## 2020-03-15 RX ORDER — MIDAZOLAM HYDROCHLORIDE 1 MG/ML
2 INJECTION INTRAMUSCULAR; INTRAVENOUS
Status: DISCONTINUED | OUTPATIENT
Start: 2020-03-15 | End: 2020-03-15 | Stop reason: HOSPADM

## 2020-03-15 RX ORDER — LIDOCAINE HYDROCHLORIDE 20 MG/ML
INJECTION, SOLUTION INFILTRATION; PERINEURAL AS NEEDED
Status: DISCONTINUED | OUTPATIENT
Start: 2020-03-15 | End: 2020-03-15 | Stop reason: SURG

## 2020-03-15 RX ORDER — ONDANSETRON 2 MG/ML
4 INJECTION INTRAMUSCULAR; INTRAVENOUS EVERY 6 HOURS PRN
Status: DISCONTINUED | OUTPATIENT
Start: 2020-03-15 | End: 2020-03-15 | Stop reason: HOSPADM

## 2020-03-15 RX ORDER — FLUMAZENIL 0.1 MG/ML
0.2 INJECTION INTRAVENOUS AS NEEDED
Status: DISCONTINUED | OUTPATIENT
Start: 2020-03-15 | End: 2020-03-15 | Stop reason: HOSPADM

## 2020-03-15 RX ADMIN — NEOSTIGMINE METHYLSULFATE 5 MG: 1 INJECTION INTRAMUSCULAR; INTRAVENOUS; SUBCUTANEOUS at 08:29

## 2020-03-15 RX ADMIN — FENTANYL CITRATE 50 MCG: 0.05 INJECTION, SOLUTION INTRAMUSCULAR; INTRAVENOUS at 07:33

## 2020-03-15 RX ADMIN — FENTANYL CITRATE 50 MCG: 50 INJECTION INTRAMUSCULAR; INTRAVENOUS at 07:57

## 2020-03-15 RX ADMIN — FENTANYL CITRATE 50 MCG: 50 INJECTION INTRAMUSCULAR; INTRAVENOUS at 08:09

## 2020-03-15 RX ADMIN — DEXAMETHASONE SODIUM PHOSPHATE 8 MG: 10 INJECTION INTRAMUSCULAR; INTRAVENOUS at 08:06

## 2020-03-15 RX ADMIN — HYDROMORPHONE HYDROCHLORIDE 1 MG: 2 INJECTION, SOLUTION INTRAMUSCULAR; INTRAVENOUS; SUBCUTANEOUS at 08:18

## 2020-03-15 RX ADMIN — ROCURONIUM BROMIDE 10 MG: 10 INJECTION, SOLUTION INTRAVENOUS at 07:57

## 2020-03-15 RX ADMIN — FAMOTIDINE 20 MG: 10 INJECTION INTRAVENOUS at 07:33

## 2020-03-15 RX ADMIN — MIDAZOLAM 1 MG: 1 INJECTION INTRAMUSCULAR; INTRAVENOUS at 09:01

## 2020-03-15 RX ADMIN — GLYCOPYRROLATE 0.8 MG: 0.2 INJECTION INTRAMUSCULAR; INTRAVENOUS at 08:29

## 2020-03-15 RX ADMIN — SUCCINYLCHOLINE CHLORIDE 160 MG: 20 INJECTION, SOLUTION INTRAMUSCULAR; INTRAVENOUS; PARENTERAL at 07:57

## 2020-03-15 RX ADMIN — OXYCODONE HYDROCHLORIDE 20 MG: 10 TABLET, FILM COATED, EXTENDED RELEASE ORAL at 07:32

## 2020-03-15 RX ADMIN — PROPOFOL 200 MG: 10 INJECTION, EMULSION INTRAVENOUS at 07:57

## 2020-03-15 RX ADMIN — FENTANYL CITRATE 50 MCG: 0.05 INJECTION, SOLUTION INTRAMUSCULAR; INTRAVENOUS at 09:00

## 2020-03-15 RX ADMIN — ONDANSETRON HYDROCHLORIDE 4 MG: 2 SOLUTION INTRAMUSCULAR; INTRAVENOUS at 08:27

## 2020-03-15 RX ADMIN — ROCURONIUM BROMIDE 30 MG: 10 INJECTION, SOLUTION INTRAVENOUS at 08:05

## 2020-03-15 RX ADMIN — ACETAMINOPHEN 1000 MG: 500 TABLET, FILM COATED ORAL at 07:32

## 2020-03-15 RX ADMIN — SODIUM CHLORIDE, POTASSIUM CHLORIDE, SODIUM LACTATE AND CALCIUM CHLORIDE 9 ML/HR: 600; 310; 30; 20 INJECTION, SOLUTION INTRAVENOUS at 07:33

## 2020-03-15 RX ADMIN — OXYCODONE HYDROCHLORIDE AND ACETAMINOPHEN 1 TABLET: 7.5; 325 TABLET ORAL at 09:16

## 2020-03-15 RX ADMIN — LIDOCAINE HYDROCHLORIDE 60 MG: 20 INJECTION, SOLUTION INFILTRATION; PERINEURAL at 07:57

## 2020-03-15 RX ADMIN — CEFAZOLIN SODIUM 2 G: 2 INJECTION, SOLUTION INTRAVENOUS at 07:52

## 2020-03-15 RX ADMIN — MIDAZOLAM 1 MG: 1 INJECTION INTRAMUSCULAR; INTRAVENOUS at 07:34

## 2020-03-15 NOTE — PLAN OF CARE
Pt present out in milieu, engages well with peers. Pt had some anxiety, and questions d/t upcoming surgery. Cooperative and compliant with medications. No thoughts to hurt self or others. Pt has been NPO since midnight. Will continue to provide feedback and support.    Problem: Patient Care Overview  Goal: Plan of Care Review  Outcome: Ongoing (interventions implemented as appropriate)  Flowsheets (Taken 3/15/2020 0503)  Progress: no change  Plan of Care Reviewed With: patient  Patient Agreement with Plan of Care: agrees  Goal: Individualization and Mutuality  Outcome: Ongoing (interventions implemented as appropriate)  Goal: Discharge Needs Assessment  Outcome: Ongoing (interventions implemented as appropriate)  Goal: Interprofessional Rounds/Family Conf  Outcome: Ongoing (interventions implemented as appropriate)     Problem: Overarching Goals (Adult)  Goal: Adheres to Safety Considerations for Self and Others  Outcome: Ongoing (interventions implemented as appropriate)  Flowsheets (Taken 3/15/2020 0503)  Adheres to Safety Considerations for Self and Others: making progress toward outcome  Goal: Optimized Coping Skills in Response to Life Stressors  Outcome: Ongoing (interventions implemented as appropriate)  Flowsheets (Taken 3/15/2020 0503)  Optimized Coping Skills in Response to Life Stressors: making progress toward outcome  Goal: Develops/Participates in Therapeutic [place] to Support Successful Transition  Outcome: Ongoing (interventions implemented as appropriate)  Flowsheets (Taken 3/15/2020 0503)  Develops/Participates in Therapeutic [place] to Support Successful Transition: making progress toward outcome     Problem: Suicidal Behavior (Adult)  Goal: Suicidal Behavior is Absent/Minimized/Managed  Outcome: Ongoing (interventions implemented as appropriate)  Flowsheets (Taken 3/15/2020 0503)  Suicidal Behavior Managed/Minimized Action Step (STG) Outcome: making progress toward outcome     Problem: Pain,  Chronic (Adult)  Goal: Identify Related Risk Factors and Signs and Symptoms  Outcome: Ongoing (interventions implemented as appropriate)  Flowsheets (Taken 3/12/2020 0401 by Faith Navarrete RN)  Related Risk Factors (Chronic Pain): pain control inadequate  Signs and Symptoms (Chronic Pain): sleep pattern change  Goal: Acceptable Pain/Comfort Level and Functional Ability  Outcome: Ongoing (interventions implemented as appropriate)  Flowsheets (Taken 3/15/2020 8387)  Acceptable Pain/Comfort Level and Functional Ability: making progress toward outcome

## 2020-03-15 NOTE — ANESTHESIA PROCEDURE NOTES
Airway  Urgency: elective    Date/Time: 3/15/2020 8:00 AM  Airway not difficult    General Information and Staff    Patient location during procedure: OR  Anesthesiologist: Erwin Jeronimo MD  CRNA: Qing Garrido CRNA    Indications and Patient Condition  Indications for airway management: airway protection    Preoxygenated: yes  MILS maintained throughout  Mask difficulty assessment: 1 - vent by mask    Final Airway Details  Final airway type: endotracheal airway      Successful airway: ETT  Cuffed: yes   Successful intubation technique: direct laryngoscopy  Facilitating devices/methods: anterior pressure/BURP and intubating stylet  Endotracheal tube insertion site: oral  Blade: Montero  Blade size: 2  ETT size (mm): 7.0  Cormack-Lehane Classification: grade IIa - partial view of glottis  Placement verified by: chest auscultation and capnometry   Cuff volume (mL): 8  Measured from: lips  ETT/EBT  to lips (cm): 21  Number of attempts at approach: 1  Assessment: lips, teeth, and gum same as pre-op and atraumatic intubation    Additional Comments  Pt preoxygenated, SIVI, bag mask vent, ATETI, dentition as before            
good balance

## 2020-03-15 NOTE — ANESTHESIA POSTPROCEDURE EVALUATION
Patient: Jesusita San    Procedure Summary     Date:  03/15/20 Room / Location:  [facility] OR [address] [place] [address] MAIN OR    Anesthesia Start:  0751 Anesthesia Stop:  0845    Procedure:  VENTRAL/INCISIONAL HERNIA REPAIR (N/A Abdomen) Diagnosis:      Surgeon:  Re Sheppard MD Provider:  Erwin Jeronimo MD    Anesthesia Type:  general ASA Status:  3          Anesthesia Type: general    Vitals  Vitals Value Taken Time   BP 92/68 3/15/2020  9:25 AM   Temp 36.8 °C (98.2 °F) 3/15/2020  8:45 AM   Pulse 92 3/15/2020  9:36 AM   Resp 12 3/15/2020  9:10 AM   SpO2 94 % 3/15/2020  9:36 AM   Vitals shown include unvalidated device data.        Post Anesthesia Care and Evaluation    Patient location during evaluation: bedside  Patient participation: complete - patient participated  Level of consciousness: awake and alert  Pain management: adequate  Airway patency: patent  Anesthetic complications: No anesthetic complications    Cardiovascular status: acceptable  Respiratory status: acceptable  Hydration status: acceptable    Comments: /73   Pulse 84   Temp 36.8 °C (98.2 °F) (Oral)   Resp 12   SpO2 94%

## 2020-03-15 NOTE — PROGRESS NOTES
Name: Jesusita San ADMIT: 3/6/2020   : 1975  PCP: Provider, No Known    MRN: [id number] LOS: 8 days   AGE/SEX: 45 y.o. female  ROOM: [facility]     Subjective   Subjective   CC: abdominal pain  No acute events.  Patient reports pain is about the same.  She is taking PO without vomiting or nausea     Objective   Objective   Physical Exam   Constitutional: She is oriented to person, place, and time. No distress.   HENT:   Head: Normocephalic and atraumatic.   Mouth/Throat: Oropharynx is clear and moist.   Eyes: Pupils are equal, round, and reactive to light. Conjunctivae and EOM are normal.   Neck: Normal range of motion. Neck supple.   Cardiovascular: Normal rate, regular rhythm and intact distal pulses.   Pulmonary/Chest: Effort normal and breath sounds normal. She has no rales.   Abdominal: Soft. Bowel sounds are normal. There is tenderness. A hernia is present.   Partially reducible ventral hernia which is very tender   Musculoskeletal: She exhibits no edema or tenderness.   Neurological: She is alert and oriented to person, place, and time.   Skin: Skin is warm and dry. Capillary refill takes less than 2 seconds. She is not diaphoretic.   Psychiatric: She has a normal mood and affect. Her behavior is normal.   Nursing note and vitals reviewed.      Results Review:       I reviewed the patient's new clinical results.  Results from last 7 days   Lab Units 20  0716   WBC 10*3/mm3 4.98   HEMOGLOBIN g/dL 10.9*   PLATELETS 10*3/mm3 253     Results from last 7 days   Lab Units 20  0716   SODIUM mmol/L 138   POTASSIUM mmol/L 3.7   CHLORIDE mmol/L 100   CO2 mmol/L 26.4   BUN mg/dL 10   CREATININE mg/dL 0.57   GLUCOSE mg/dL 98   Estimated Creatinine Clearance: 153.5 mL/min (by C-G formula based on SCr of 0.57 mg/dL).  Results from last 7 days   Lab Units 20  0716   ALBUMIN g/dL 4.10   BILIRUBIN mg/dL 0.6   ALK PHOS U/L 85   AST (SGOT) U/L 9   ALT (SGPT) U/L 9     Results from last 7 days   Lab  Units 03/14/20  0716   CALCIUM mg/dL 9.4   ALBUMIN g/dL 4.10       No results found for: HGBA1C, POCGLU      ALPRAZolam 0.5 mg Oral TID   ALPRAZolam 1 mg Oral Nightly   baclofen 10 mg Oral TID   Desvenlafaxine Succinate ER 50 mg Oral Daily   famotidine 40 mg Oral Nightly   lisinopril-hydroCHLOROthiazide (ZESTORETIC) 10-12.5 mg combo dose  Oral Q24H   mupirocin  Topical Q12H   nystatin  Topical Q12H   oxyCODONE-acetaminophen 1 tablet Oral TID   prazosin 5 mg Oral Nightly      Diet Regular; GI Soft  NPO Diet       Assessment/Plan     Active Hospital Problems    Diagnosis  POA   • **Major depression [F32.9]  Yes   • Abdominal pain [R10.9]  Yes   • Umbilical hernia, incarcerated [K42.0]  No   • Delayed gastric emptying [K30]  Unknown   • Yeast infection of the skin [B37.2]  Unknown   • Multiple abrasions [T07.XXXA]  Unknown   • CKD (chronic kidney disease) [N18.9]  Unknown   • Hypertension [I10]  Unknown   • Bed wetting [N39.44]  Unknown   • Chronic anemia [D64.9]  Unknown   • Cervical cancer (CMS/HCC) [C53.9]  Yes   • Chronic back pain [M54.9, G89.29]  Yes   • GERD without esophagitis [K21.9]  Yes      Resolved Hospital Problems   No resolved problems to display.   Abdominal Pain  - has incisional hernia which is most likely the source, also history of PUD and possible gastroparesis  - no incarcerated bowel on CT  - general surgery has evaluated and is planning repair of the hernia and EGD to investigate possible gastric outlet syndrome    HTN  - continue on lisinopril/HCTZ, hold parameters are in place    Abnormal UA  - no UTI, looks contaminated    CKD  - by history only-I question this diagnosis as Cr is quite normal and she has no proteinuria      The patient is medically stable and her abdominal issues are being managed by surgery.  I will therefore sign off.  Please call if we can be of further assistance.    David Steven MD  [facility]  03/14/20  20:57

## 2020-03-15 NOTE — OP NOTE
SURGERY  Operative Note :  VALARIE    Jesusita Jaswinder  1975    Procedure Date: 03/15/20    Pre-op Diagnosis:  · Incisional hernia, incarcerated, small    Post-op Diagnosis:  · Same    Procedure:   · Repair of incisional hernia, primarily    Surgeon: Valarie    Assistant: Nicoal    Indications:  · Nice 45-year-old comes in with depression, but during her stay in the crisis management unit began to have escalating pain in the abdominal region, with a palpable hernia.  CT scan shows that the defect is small but there is considerable amount of fat within it, some of that being mesenteric fat, accounting for her discomfort.    Associated Issues:  · BMI 43  Findings:   · Material incarcerated was partially mesenteric, partially extraperitoneal fat, about the size of a small tangerine.  Much of it reduced, a portion resected  · Defect about 2 cm and closed primarily transversely with 0 Ethibond    Recommendations:   · We will allow discharge today.  · In the context of the coronavirus we will not asked that she follow-up in the office immediately for this procedure.  · She needs to follow-up in about 2 months or so so that we can evaluate for gastric outlet obstruction, she having a large amount of food in her stomach on CT scan and prior history of a large duodenal ulcer and a study that showed gastric emptying delay.    Specimens:   · None    EBL: Less than 25 cc    Technique:     General anesthetic was induced, abdomen prepped with ChloraPrep and draped sterilely.  Half percent Marcaine with epinephrine was used, transverse elliptical excision to excise the prior vertical incisional scar, and carried down through the subcutaneous tissue through the fat until I reached the  Bulbous tissue that was extruding through the abdominal wall.  The depth of her abdominal wall were about 3-1/2 cm, the bulbous tissue that was dissected out about 6 cm.  I dissected around the top of it, lifted the sides, and was able to pull  the  mass to the side in order to get down to the neck.  I did this circumferentially until I could detect that there was a defect.  Then it began to come across the fat until I saw some tissue in the central portion of that that seemed to be more consistent with the mesentery and I would thus let that reduce.    I then cleaned up the surrounding fascia, and closed with interrupted 0 Ethibonds transversely.  I closed the deep subcutaneous tissue with interrupted 3-0 Vicryls to close that empty space, dermis with interrupted 3-0 Vicryl's, skin with running 5-0 Vicryl.  Dermabond.    Re Sheppard MD  03/15/20  9:00 AM

## 2020-03-15 NOTE — ANESTHESIA PREPROCEDURE EVALUATION
Anesthesia Evaluation     Patient summary reviewed   no history of anesthetic complications:  NPO Solid Status: > 8 hours  NPO Liquid Status: > 2 hours           Airway   Mallampati: IV  TM distance: >3 FB  Neck ROM: full  Possible difficult intubation and Large neck circumference  Dental          Pulmonary     breath sounds clear to auscultation  (-) shortness of breath, not a smoker  Cardiovascular   Exercise tolerance: good (4-7 METS)    Rhythm: regular  Rate: normal    (+) hypertension,   (-) angina, ZAIDI      Neuro/Psych  (+) psychiatric history Anxiety, Depression and ADD,     GI/Hepatic/Renal/Endo    (+)  GERD well controlled, PUD,  renal disease,     Musculoskeletal     (+) back pain,   Abdominal    Substance History      OB/GYN          Other      history of cancer                  Anesthesia Plan    ASA 3     general     intravenous induction     Anesthetic plan, all risks, benefits, and alternatives have been provided, discussed and informed consent has been obtained with: patient.

## 2020-03-15 NOTE — H&P
"Expand All Collapse All      SURGERY  Jesusita San   1975        Chief Complaint: Abdominal pain  Date of admission: 3/6/2020   Today's date:  03/14/20     HPI    For hernia repair today, consult from yesterday below.     Ms. San is a nice 45 y.o. female who is in the crisis management unit for depression, who notes that prior to admission about 9 days ago, she was having abdominal pain but not to this extent.  Over the last couple of days she has begun to have severe pain, that prompted an evaluation by the hospitalist.  Their impression was that she had a hernia which was partially reducible.  She is exquisitely tender at that site, and says that it appears to be getting larger.  In addition however she gives a history of having a very distended abdomen, feeling like she looks like she is \"5 months pregnant\" but of course not so.  She also feels like she is having crampy abdominal pain.     I reviewed the CT scan which was just done at [facility], with no formal reading, and there does appear to be a small hernia at the trocar site in the lower epigastric region where her exquisite area of tenderness is, but with only fatty tissue in it.  There is: Immediately adjacent, and there is the chance that this could have just been reduced or that there is an epiploica contained that is tugging on the colon.  In addition she has a small umbilical hernia.  Very notable however is a large amount of material in her stomach, and she has a history of a duodenal ulcer, and I am wondering about some sort of gastric emptying issue.     I looked both in the media section, as well as the procedure and op note section, without any findings of treatment of a duodenal ulcer.  It does look like she has had treatment through Dr. Ivan Good in 2018 at [facility].  Review of that note indicated that the ulcer was actually seen on CT so it must have been pretty impressive.  On her evaluation at [facility] later in 2018, there is a " "diagnosis of gastroparesis, with a gastric emptying study showing moderate prolongation of gastric emptying.  Reviewing the CT scan, her stomach is superior to the area of the greatest tenderness at the hernia.     Complicating issues regarding a potential surgery are that of her chronic depression, chronic pain on oxycodone 7.5, PTSD, and her BMI is 42.9.  She is very anxious to have this repaired prior to leaving the hospital as she does not feel that she can tolerate the pain.     Medical History        Past Medical History:   Diagnosis Date   • ADD (attention deficit disorder)     • Back pain     • Cervical cancer (CMS/HCC)     • Cervical spinal cord injury (CMS/HCC)     • CKD (chronic kidney disease)     • Depression     • Duodenal ulcer     • GERD (gastroesophageal reflux disease)     • Herpes simplex infection     • Hypertension     • Kidney stones     • PTSD (post-traumatic stress disorder)           Surgical History         Past Surgical History:   Procedure Laterality Date   • ANAL FISSURECTOMY       • APPENDECTOMY       •  SECTION       • COLONOSCOPY       • ENDOSCOPY       • KIDNEY STONE SURGERY       • TONSILLECTOMY AND ADENOIDECTOMY       • TUBAL ABDOMINAL LIGATION       • TYMPANOSTOMY TUBE PLACEMENT       • WISDOM TOOTH EXTRACTION                   Family History   Problem Relation Age of Onset   • Heart disease Father            from MI at age 50   • Early death Father     • Hypertension Father     • Colon cancer Maternal Grandfather     • Stroke Maternal Grandfather        Social History   Social History            Socioeconomic History   • Marital status: Single       Spouse name: Not on file   • Number of children: Not on file   • Years of education: Not on file   • Highest education level: Not on file   Tobacco Use   • Smoking status: Never Smoker   • Smokeless tobacco: Never Used   Substance and Sexual Activity   • Alcohol use: Yes       Comment: \"socially\"   • Drug use: Not " Currently   • Sexual activity: Defer               Current Facility-Administered Medications:   •  acetaminophen (TYLENOL) tablet 650 mg, 650 mg, Oral, Q4H PRN, Deven Landry III, MD, 650 mg at 03/14/20 0147  •  ALPRAZolam (XANAX) tablet 0.5 mg, 0.5 mg, Oral, TID, Deven Landry III, MD, 0.5 mg at 03/14/20 0829  •  ALPRAZolam (XANAX) tablet 1 mg, 1 mg, Oral, Nightly, Deven Landry III, MD, 1 mg at 03/13/20 2109  •  aluminum-magnesium hydroxide-simethicone (MAALOX MAX) 400-400-40 MG/5ML suspension 15 mL, 15 mL, Oral, Q6H PRN, Deven Landry III, MD  •  baclofen (LIORESAL) tablet 10 mg, 10 mg, Oral, TID, Deven Landry III, MD, 10 mg at 03/14/20 0829  •  desvenlafaxine (PRISTIQ) 24 hr tablet 50 mg, 50 mg, Oral, Daily, Deven Landry III, MD, 50 mg at 03/14/20 0829  •  famotidine (PEPCID) tablet 40 mg, 40 mg, Oral, Nightly, Deven Landry III, MD, 40 mg at 03/13/20 2110  •  lisinopril (PRINIVIL,ZESTRIL) 10 mg, hydroCHLOROthiazide (HYDRODIURIL) 12.5 mg for ZESTORETIC 10-12.5, , Oral, Q24H, Annie Leach APRN  •  loperamide (IMODIUM) capsule 2 mg, 2 mg, Oral, Q2H PRN, Deven Landry III, MD  •  magnesium hydroxide (MILK OF MAGNESIA) suspension 2400 mg/10mL 10 mL, 10 mL, Oral, Daily PRN, Deven Landry III, MD  •  mupirocin (BACTROBAN) 2 % ointment, , Topical, Q12H, Annie Leach APRN  •  nystatin (MYCOSTATIN) powder, , Topical, Q12H, Haylee, Annie, APRN  •  oxyCODONE-acetaminophen (PERCOCET) 5-325 MG per tablet 1 tablet, 1 tablet, Oral, TID, Deven Landry III, MD, 1 tablet at 03/14/20 0828  •  prazosin (MINIPRESS) capsule 5 mg, 5 mg, Oral, Nightly, BaysKemal MD, 5 mg at 03/13/20 2110  •  sodium chloride 0.9 % flush 10 mL, 10 mL, Intravenous, PRN, Re Sheppard MD          Allergies   Allergen Reactions   • Tramadol Unknown - Low Severity      Review of Systems  Absence of fevers,  blood in her stool, emesis.  Predominant complaints are pain at the site and in her neck.  All other systems reviewed and negative.     Vitals          Vitals:     03/11/20 0700 03/12/20 0956 03/12/20 1955 03/13/20 0652   BP: 105/71 146/95 101/65 104/67   BP Location: Left arm Left arm Right arm Right arm   Patient Position: Lying Sitting Sitting Lying   Pulse: 82 103 77 87   Resp: 16 16 16 18   Temp: 97.4 °F (36.3 °C) 97.2 °F (36.2 °C) 97.1 °F (36.2 °C) 97 °F (36.1 °C)   TempSrc: Oral Oral Oral Oral   SpO2: 99% 95% 98% 94%            PHYSICAL EXAM:     /67 (BP Location: Right arm, Patient Position: Lying)   Pulse 87   Temp 97 °F (36.1 °C) (Oral)   Resp 18   SpO2 94%   There is no height or weight on file to calculate BMI.     Constitutional: well developed, well nourished, appears moderately healthy, located in the crisis unit, excess weight  Eyes: sclera nonicteric, conjunctiva not injected   ENMT: Hearing intact, trachea midline, dentitia in good order  CVS: RRR, no murmur, peripheral edema not present  Respiratory: CTA, normal respiratory effort   Gastrointestinal: no hepatosplenomegaly, abdomen rounded, considerable subcutaneous adipose tissue, abdominal hernia suspected in the lower epigastric region where a prior trocar has been, with what appears to be about a 4 to 5 cm area of subcutaneous fatty tissue, very tender, nonreducible  Genitourinary: inguinal hernia not present  Musculoskeletal: gait normal, muscle mass normal  Skin: warm and dry, no rashes visible  Neurological: awake and alert, seems to have reasonable capacity for understanding for medical decision making  Psychiatric: appears to have reasonable judgement, very pleasant  Lymphatics: no cervical adenopathy     Radiographic/Lab Findings: CT as listed     IMPRESSION:  · Incisional hernia, lower epigastric region, incarcerated, increasingly tender.  While this is not an incredibly large hernia, it is very tender and may include an  epiploica of the colon.  It does not contain bowel at this point.  · Gastroparesis versus gastric outlet obstruction from prior duodenal ulcer disease.  This could account for her abdominal distention to some degree.  · BMI 43  · Chronic depression in the crisis management unit  · Chronic pain on oxycodone 7.5 through [facility]  · Bedwetting  · Chronic kidney disease  · History of self medialization  · Anxiety and depression with history of suicidal ideation  · History of cervical cancer     PLAN:  · I am going to plan to go ahead and take care of this hernia while she is here and I told her that she will need to find a ride to pick her up from the recovery room.  We will not be able to proceed if she does not have a ride set up that we will be able to, and take responsibility for her.  · We will give her 10 tablets of pain medication postoperatively and she can get the remainder from her pain management system.  · She will need an EGD to evaluate this possibility of gastroparesis versus gastric outlet syndrome.  If in fact it has gastric outlet, we may be able to dilate this in get a little relief.  · Discussed with the patient the potential need for mesh since this is an incisional hernia  · Discussed with patient increased perioperative risks associated with obesity including increased risks of DVT, infection, seromas, poor wound healing and hernias (with abdominal surgery).     Re Sheppard MD  Saturday

## 2020-03-15 NOTE — NURSING NOTE
Pt was picked up by hospital transport personnel via stretcher at approx 0635. Pt's belongings were sent with pt. Report was called into Pre-Op holding.

## 2020-03-16 NOTE — PROGRESS NOTES
Continued Stay Note  [facility]     Patient Name: Jesusita San  MRN: [id number]  Today's Date: 3/16/2020    Admit Date: 3/6/2020    Discharge Plan     Row Name 03/16/20 1546       Plan    Final Discharge Disposition Code  30 - still a patient    Final Note  Pt was discharged on 3/15/2020 per Doctor's orders.  Pt was discharged to a medical unit.        Discharge Codes    No documentation.       Expected Discharge Date and Time     Expected Discharge Date Expected Discharge Time    Mar 15, 2020  6:35 AM            MIRELLA Matthews

## 2020-03-17 ENCOUNTER — NURSE TRIAGE (OUTPATIENT)
Dept: CALL CENTER | Facility: HOSPITAL | Age: 45
End: 2020-03-17

## 2020-03-17 NOTE — TELEPHONE ENCOUNTER
Hernia surgery 03/15/2020. Yesterday incision was bruised. Steri strips came off. Incision is closed, no gapping. Today had a visit at her pain clinic and the APRN said the incision did not look right. Around the incision it is bright red/orange in color, underneath her skin. Has became more painful. Care advice per guideline.    Reason for Disposition  • [1] INCREASING pain in incision AND [2] > 2 days (48 hours) since surgery    Additional Information  • Negative: [1] Major abdominal surgical incision AND [2] wound gaping open AND [3] visible internal organs  • Negative: Sounds like a life-threatening emergency to the triager  • Negative: [1] Bleeding from incision AND [2] won't stop after 10 minutes of direct pressure  • Negative: [1] Widespread rash AND [2] bright red, sunburn-like  • Negative: Severe pain in the incision  • Negative: [1] Incision gaping open AND [2] < 48 hours since wound re-opened  • Negative: [1] Incision gaping open AND [2] length of opening > 2 inches (5 cm)  • Negative: Patient sounds very sick or weak to the triager  • Negative: Sounds like a serious complication to the triager  • Negative: Fever > 100.4 F (38.0 C)  • Negative: [1] Incision looks infected (spreading redness, pain) AND [2] fever > 99.5 F (37.5 C)  • Negative: [1] Incision looks infected (spreading redness, pain) AND [2] large red area (> 2 in. or 5 cm)  • Negative: [1] Incision looks infected (spreading redness, pain) AND [2] face wound  • Negative: [1] Red streak runs from the incision AND [2] longer than 1 inch (2.5 cm)  • Negative: [1] Pus or bad-smelling fluid draining from incision AND [2] no fever  • Negative: [1] Post-op pain AND [2] not controlled with pain medications  • Negative: Dressing soaked with blood or body fluid (e.g., drainage)  • Negative: [1] Raised bruise and [2] size > 2 inches (5 cm) and expanding  • Negative: [1] Caller has URGENT question AND [2] triager unable to answer question    Answer  "Assessment - Initial Assessment Questions  1. SYMPTOM: \"What's the main symptom you're concerned about?\" (e.g., redness, pain, drainage)      redness  2. ONSET: \"When did *No Answer*  start?\"      today  3. SURGERY: \"What surgery was performed?\"      hernia  4. DATE of SURGERY: \"When was surgery performed?\"       03/15/2020  5. INCISION SITE: \"Where is the incision located?\"       Above belly button  6. REDNESS: \"Is there any redness at the incision site?\" If yes, ask: \"How wide across is the redness?\" (Inches, centimeters)       Redness about the size of a tangerine size  7. PAIN: \"Is there any pain?\" If so, ask: \"How bad is it?\"  (Scale 1-10; or mild, moderate, severe)      *No Answer*  8. BLEEDING: \"Is there any bleeding?\" If so, ask: \"How much?\" and \"Where?\"      no  9. DRAINAGE: \"Is there any drainage from the incision site?\" If yes, ask: \"What color and how much?\" (e.g., red, cloudy, pus; drops, teaspoon)      no  10. FEVER: \"Do you have a fever?\" If so, ask: \"What is your temperature, how was it measured, and when did it start?\"        No fever  11. OTHER SYMPTOMS: \"Do you have any other symptoms?\" (e.g., shaking chills, weakness, rash elsewhere on body)        *No Answer*    Protocols used: POST-OP INCISION SYMPTOMS-ADULT-AH      "

## 2020-07-23 ENCOUNTER — HOSPITAL ENCOUNTER (EMERGENCY)
Facility: HOSPITAL | Age: 45
Discharge: ANOTHER HOSPITAL | End: 2020-07-24
Attending: EMERGENCY MEDICINE | Admitting: EMERGENCY MEDICINE

## 2020-07-23 DIAGNOSIS — F11.20 NARCOTIC DEPENDENCE (HCC): ICD-10-CM

## 2020-07-23 DIAGNOSIS — R45.851 SUICIDAL IDEATION: Primary | ICD-10-CM

## 2020-07-23 DIAGNOSIS — Z79.899 LONG TERM PRESCRIPTION BENZODIAZEPINE USE: ICD-10-CM

## 2020-07-23 LAB
ALBUMIN SERPL-MCNC: 4 G/DL (ref 3.5–5.2)
ALBUMIN/GLOB SERPL: 1.5 G/DL
ALP SERPL-CCNC: 71 U/L (ref 39–117)
ALT SERPL W P-5'-P-CCNC: 14 U/L (ref 1–33)
AMPHET+METHAMPHET UR QL: NEGATIVE
ANION GAP SERPL CALCULATED.3IONS-SCNC: 9.3 MMOL/L (ref 5–15)
APAP SERPL-MCNC: <5 MCG/ML (ref 10–30)
AST SERPL-CCNC: 14 U/L (ref 1–32)
B-HCG UR QL: NEGATIVE
BARBITURATES UR QL SCN: NEGATIVE
BASOPHILS # BLD AUTO: 0.04 10*3/MM3 (ref 0–0.2)
BASOPHILS NFR BLD AUTO: 0.6 % (ref 0–1.5)
BENZODIAZ UR QL SCN: POSITIVE
BILIRUB SERPL-MCNC: 0.4 MG/DL (ref 0–1.2)
BILIRUB UR QL STRIP: NEGATIVE
BUN SERPL-MCNC: 12 MG/DL (ref 6–20)
BUN/CREAT SERPL: 22.6 (ref 7–25)
CALCIUM SPEC-SCNC: 9.3 MG/DL (ref 8.6–10.5)
CANNABINOIDS SERPL QL: NEGATIVE
CHLORIDE SERPL-SCNC: 105 MMOL/L (ref 98–107)
CLARITY UR: CLEAR
CO2 SERPL-SCNC: 23.7 MMOL/L (ref 22–29)
COCAINE UR QL: NEGATIVE
COLOR UR: YELLOW
CREAT SERPL-MCNC: 0.53 MG/DL (ref 0.57–1)
DEPRECATED RDW RBC AUTO: 41.5 FL (ref 37–54)
EOSINOPHIL # BLD AUTO: 0.25 10*3/MM3 (ref 0–0.4)
EOSINOPHIL NFR BLD AUTO: 3.7 % (ref 0.3–6.2)
ERYTHROCYTE [DISTWIDTH] IN BLOOD BY AUTOMATED COUNT: 14.3 % (ref 12.3–15.4)
ETHANOL BLD-MCNC: <10 MG/DL (ref 0–10)
ETHANOL UR QL: <0.01 %
GFR SERPL CREATININE-BSD FRML MDRD: 125 ML/MIN/1.73
GLOBULIN UR ELPH-MCNC: 2.6 GM/DL
GLUCOSE SERPL-MCNC: 105 MG/DL (ref 65–99)
GLUCOSE UR STRIP-MCNC: NEGATIVE MG/DL
HCT VFR BLD AUTO: 33.7 % (ref 34–46.6)
HGB BLD-MCNC: 10.9 G/DL (ref 12–15.9)
HGB UR QL STRIP.AUTO: NEGATIVE
HOLD SPECIMEN: NORMAL
HOLD SPECIMEN: NORMAL
IMM GRANULOCYTES # BLD AUTO: 0.03 10*3/MM3 (ref 0–0.05)
IMM GRANULOCYTES NFR BLD AUTO: 0.4 % (ref 0–0.5)
KETONES UR QL STRIP: NEGATIVE
LEUKOCYTE ESTERASE UR QL STRIP.AUTO: NEGATIVE
LYMPHOCYTES # BLD AUTO: 1.15 10*3/MM3 (ref 0.7–3.1)
LYMPHOCYTES NFR BLD AUTO: 16.9 % (ref 19.6–45.3)
MCH RBC QN AUTO: 25.9 PG (ref 26.6–33)
MCHC RBC AUTO-ENTMCNC: 32.3 G/DL (ref 31.5–35.7)
MCV RBC AUTO: 80 FL (ref 79–97)
METHADONE UR QL SCN: NEGATIVE
MONOCYTES # BLD AUTO: 0.33 10*3/MM3 (ref 0.1–0.9)
MONOCYTES NFR BLD AUTO: 4.8 % (ref 5–12)
NEUTROPHILS NFR BLD AUTO: 5.01 10*3/MM3 (ref 1.7–7)
NEUTROPHILS NFR BLD AUTO: 73.6 % (ref 42.7–76)
NITRITE UR QL STRIP: NEGATIVE
NRBC BLD AUTO-RTO: 0 /100 WBC (ref 0–0.2)
OPIATES UR QL: POSITIVE
OXYCODONE UR QL SCN: POSITIVE
PH UR STRIP.AUTO: 8.5 [PH] (ref 5–8)
PLATELET # BLD AUTO: 233 10*3/MM3 (ref 140–450)
PMV BLD AUTO: 11.1 FL (ref 6–12)
POTASSIUM SERPL-SCNC: 4.2 MMOL/L (ref 3.5–5.2)
PROT SERPL-MCNC: 6.6 G/DL (ref 6–8.5)
PROT UR QL STRIP: NEGATIVE
RBC # BLD AUTO: 4.21 10*6/MM3 (ref 3.77–5.28)
SALICYLATES SERPL-MCNC: <0.3 MG/DL
SODIUM SERPL-SCNC: 138 MMOL/L (ref 136–145)
SP GR UR STRIP: 1.02 (ref 1–1.03)
UROBILINOGEN UR QL STRIP: ABNORMAL
WBC # BLD AUTO: 6.81 10*3/MM3 (ref 3.4–10.8)
WHOLE BLOOD HOLD SPECIMEN: NORMAL
WHOLE BLOOD HOLD SPECIMEN: NORMAL

## 2020-07-23 PROCEDURE — 81003 URINALYSIS AUTO W/O SCOPE: CPT | Performed by: EMERGENCY MEDICINE

## 2020-07-23 PROCEDURE — 80307 DRUG TEST PRSMV CHEM ANLYZR: CPT | Performed by: EMERGENCY MEDICINE

## 2020-07-23 PROCEDURE — 36415 COLL VENOUS BLD VENIPUNCTURE: CPT

## 2020-07-23 PROCEDURE — 90791 PSYCH DIAGNOSTIC EVALUATION: CPT

## 2020-07-23 PROCEDURE — 81025 URINE PREGNANCY TEST: CPT | Performed by: EMERGENCY MEDICINE

## 2020-07-23 PROCEDURE — 96372 THER/PROPH/DIAG INJ SC/IM: CPT

## 2020-07-23 PROCEDURE — 99285 EMERGENCY DEPT VISIT HI MDM: CPT

## 2020-07-23 PROCEDURE — 80053 COMPREHEN METABOLIC PANEL: CPT | Performed by: EMERGENCY MEDICINE

## 2020-07-23 PROCEDURE — 85025 COMPLETE CBC W/AUTO DIFF WBC: CPT | Performed by: EMERGENCY MEDICINE

## 2020-07-23 RX ORDER — FAMOTIDINE 20 MG/1
20 TABLET, FILM COATED ORAL
Status: DISCONTINUED | OUTPATIENT
Start: 2020-07-24 | End: 2020-07-24 | Stop reason: HOSPADM

## 2020-07-23 RX ORDER — BACLOFEN 10 MG/1
10 TABLET ORAL ONCE
Status: COMPLETED | OUTPATIENT
Start: 2020-07-23 | End: 2020-07-23

## 2020-07-23 RX ORDER — ALPRAZOLAM 0.25 MG/1
0.5 TABLET ORAL ONCE
Status: COMPLETED | OUTPATIENT
Start: 2020-07-23 | End: 2020-07-23

## 2020-07-23 RX ORDER — OXYCODONE HYDROCHLORIDE AND ACETAMINOPHEN 5; 325 MG/1; MG/1
2 TABLET ORAL ONCE
Status: COMPLETED | OUTPATIENT
Start: 2020-07-23 | End: 2020-07-23

## 2020-07-23 RX ORDER — OLANZAPINE 10 MG/1
10 INJECTION, POWDER, LYOPHILIZED, FOR SOLUTION INTRAMUSCULAR ONCE
Status: COMPLETED | OUTPATIENT
Start: 2020-07-23 | End: 2020-07-23

## 2020-07-23 RX ORDER — OXYCODONE AND ACETAMINOPHEN 7.5; 325 MG/1; MG/1
1 TABLET ORAL EVERY 6 HOURS PRN
Status: ON HOLD | COMMUNITY
End: 2020-09-25

## 2020-07-23 RX ORDER — TERAZOSIN 5 MG/1
5 CAPSULE ORAL NIGHTLY
Status: DISCONTINUED | OUTPATIENT
Start: 2020-07-23 | End: 2020-07-24 | Stop reason: HOSPADM

## 2020-07-23 RX ADMIN — OLANZAPINE 10 MG: 10 INJECTION, POWDER, LYOPHILIZED, FOR SOLUTION INTRAMUSCULAR at 22:11

## 2020-07-23 RX ADMIN — ALPRAZOLAM 0.5 MG: 0.25 TABLET ORAL at 17:35

## 2020-07-23 RX ADMIN — OXYCODONE HYDROCHLORIDE AND ACETAMINOPHEN 2 TABLET: 5; 325 TABLET ORAL at 17:35

## 2020-07-23 RX ADMIN — BACLOFEN 7.5 MG: 10 TABLET ORAL at 17:35

## 2020-07-23 NOTE — ED NOTES
"Pt refused covid test multiple times and said \"I was told I didn't have to have one for here.\" When told that this test was required for psych admission, pt stated \"well then I'm leaving\". Pt was informed she cannot leave and is on a 72 hour hold. Pt still refused covid test at this time. Access was informed and will ask pt again later. Pt informed GURWINDER Mcqueen \"I'd rather ill myself than have a covid swab.\"    Brittani Dyer, GURWINDER  07/23/20 1602       Brittani Dyer RN  07/23/20 1605    "

## 2020-07-23 NOTE — ED PROVIDER NOTES
EMERGENCY DEPARTMENT ENCOUNTER    Room Number:  10/10  Date of encounter:  7/23/2020  PCP: Provider, No Known  Historian: Pt      HPI:  Chief Complaint: Suicidal ideation; severe depression    A complete HPI/ROS/PMH/PSH/SH/FH are unobtainable due to: N/A   Context: Jesusita San is a 45 y.o. female who presents to the ED c/o worsening severity of depression with suicidal ideation and current plan to kill herself.  Symptoms exacerbated when her roommate and best friend overdosed in front of her yesterday.  Multiple social stressors.  History of suicidal ideation with psychiatric admission earlier this year.  Patient denies illicit substance use and alcohol abuse.  Patient reports to me that if she is discharged she will go home and kill herself.  Patient reports that she was supposed to be on Pristiq but could not get this prescription filled, took her Prozac as prescribed until about 3 weeks ago and then self discontinued.  She continues to take her oxycodone, Xanax and baclofen.    MEDICAL HISTORY REVIEW      PAST MEDICAL HISTORY  Active Ambulatory Problems     Diagnosis Date Noted   • Suicidal ideation 02/10/2020   • Chronic back pain 02/10/2020   • Anxiety and depression 02/10/2020   • Cervical cancer (CMS/HCC) 02/10/2020   • GERD without esophagitis 02/10/2020   • Major depression 03/06/2020   • Yeast infection of the skin 03/06/2020   • Multiple abrasions 03/06/2020   • CKD (chronic kidney disease) 03/06/2020   • Hypertension 03/06/2020   • Bed wetting 03/06/2020   • Chronic anemia 03/06/2020   • Abdominal pain 03/14/2020   • Incarcerated incisional hernia 03/14/2020   • Delayed gastric emptying 03/14/2020     Resolved Ambulatory Problems     Diagnosis Date Noted   • No Resolved Ambulatory Problems     Past Medical History:   Diagnosis Date   • ADD (attention deficit disorder)    • Back pain    • Cervical spinal cord injury (CMS/HCC)    • Depression    • Duodenal ulcer    • GERD (gastroesophageal reflux disease)  "   • Herpes simplex infection    • Kidney stones    • PTSD (post-traumatic stress disorder)          PAST SURGICAL HISTORY  Past Surgical History:   Procedure Laterality Date   • ANAL FISSURECTOMY     • APPENDECTOMY     •  SECTION     • COLONOSCOPY     • ENDOSCOPY     • KIDNEY STONE SURGERY     • TONSILLECTOMY AND ADENOIDECTOMY     • TUBAL ABDOMINAL LIGATION     • TYMPANOSTOMY TUBE PLACEMENT     • VENTRAL/INCISIONAL HERNIA REPAIR N/A 3/15/2020    Procedure: VENTRAL/INCISIONAL HERNIA REPAIR;  Surgeon: Re Sheppard MD;  Location: [facility] OR;  Service: General;  Laterality: N/A;   • WISDOM TOOTH EXTRACTION           FAMILY HISTORY  Family History   Problem Relation Age of Onset   • Heart disease Father          from MI at age 50   • Early death Father    • Hypertension Father    • Colon cancer Maternal Grandfather    • Stroke Maternal Grandfather    • Malig Hyperthermia Neg Hx          SOCIAL HISTORY  Social History     Socioeconomic History   • Marital status: Single     Spouse name: Not on file   • Number of children: Not on file   • Years of education: Not on file   • Highest education level: Not on file   Tobacco Use   • Smoking status: Never Smoker   • Smokeless tobacco: Never Used   Substance and Sexual Activity   • Alcohol use: Yes     Comment: \"socially\"   • Drug use: Not Currently   • Sexual activity: Defer         ALLERGIES  Shellfish-derived products and Tramadol        REVIEW OF SYSTEMS  Review of Systems     All systems reviewed and negative except for those discussed in HPI.       PHYSICAL EXAM    I have reviewed the triage vital signs and nursing notes.    ED Triage Vitals [20 1321]   Temp Heart Rate Resp BP SpO2   98.6 °F (37 °C) 106 18 -- 99 %      Temp src Heart Rate Source Patient Position BP Location FiO2 (%)   -- -- -- -- --       Physical Exam    Physical Exam   Constitutional: No distress.   HENT:  Head: Normocephalic and atraumatic.   Oropharynx: Mucous membranes are " moist.   Eyes: No scleral icterus. No conjunctival pallor.  Neck: Painless range of motion noted. Neck supple.   Cardiovascular: Normal rate, regular rhythm and intact distal pulses.  Pulmonary/Chest: No respiratory distress. There are no wheezes, no rhonchi, and no rales.   Abdominal: Soft. There is no tenderness. There is no rebound and no guarding.   Musculoskeletal: Moves all extremities equally. There is no pedal edema or calf tenderness.   Neurological: Alert.  Baseline strength and sensation noted.   Skin: Skin is pink, warm, and dry. No pallor.   Psychiatric: Labile mood, depressed  Nursing note and vitals reviewed.    LAB RESULTS  Recent Results (from the past 24 hour(s))   Light Blue Top    Collection Time: 07/23/20  1:36 PM   Result Value Ref Range    Extra Tube hold for add-on    Green Top (Gel)    Collection Time: 07/23/20  1:36 PM   Result Value Ref Range    Extra Tube Hold for add-ons.    Lavender Top    Collection Time: 07/23/20  1:36 PM   Result Value Ref Range    Extra Tube hold for add-on    Gold Top - SST    Collection Time: 07/23/20  1:36 PM   Result Value Ref Range    Extra Tube Hold for add-ons.    Ethanol    Collection Time: 07/23/20  1:36 PM   Result Value Ref Range    Ethanol <10 0 - 10 mg/dL    Ethanol % <0.010 %   Acetaminophen Level    Collection Time: 07/23/20  1:36 PM   Result Value Ref Range    Acetaminophen <5.0 (L) 10.0 - 30.0 mcg/mL   Salicylate Level    Collection Time: 07/23/20  1:36 PM   Result Value Ref Range    Salicylate <0.3 <=30.0 mg/dL   Comprehensive Metabolic Panel    Collection Time: 07/23/20  1:36 PM   Result Value Ref Range    Glucose 105 (H) 65 - 99 mg/dL    BUN 12 6 - 20 mg/dL    Creatinine 0.53 (L) 0.57 - 1.00 mg/dL    Sodium 138 136 - 145 mmol/L    Potassium 4.2 3.5 - 5.2 mmol/L    Chloride 105 98 - 107 mmol/L    CO2 23.7 22.0 - 29.0 mmol/L    Calcium 9.3 8.6 - 10.5 mg/dL    Total Protein 6.6 6.0 - 8.5 g/dL    Albumin 4.00 3.50 - 5.20 g/dL    ALT (SGPT) 14 1 - 33  U/L    AST (SGOT) 14 1 - 32 U/L    Alkaline Phosphatase 71 39 - 117 U/L    Total Bilirubin 0.4 0.0 - 1.2 mg/dL    eGFR Non African Amer 125 >60 mL/min/1.73    Globulin 2.6 gm/dL    A/G Ratio 1.5 g/dL    BUN/Creatinine Ratio 22.6 7.0 - 25.0    Anion Gap 9.3 5.0 - 15.0 mmol/L   CBC Auto Differential    Collection Time: 07/23/20  1:36 PM   Result Value Ref Range    WBC 6.81 3.40 - 10.80 10*3/mm3    RBC 4.21 3.77 - 5.28 10*6/mm3    Hemoglobin 10.9 (L) 12.0 - 15.9 g/dL    Hematocrit 33.7 (L) 34.0 - 46.6 %    MCV 80.0 79.0 - 97.0 fL    MCH 25.9 (L) 26.6 - 33.0 pg    MCHC 32.3 31.5 - 35.7 g/dL    RDW 14.3 12.3 - 15.4 %    RDW-SD 41.5 37.0 - 54.0 fl    MPV 11.1 6.0 - 12.0 fL    Platelets 233 140 - 450 10*3/mm3    Neutrophil % 73.6 42.7 - 76.0 %    Lymphocyte % 16.9 (L) 19.6 - 45.3 %    Monocyte % 4.8 (L) 5.0 - 12.0 %    Eosinophil % 3.7 0.3 - 6.2 %    Basophil % 0.6 0.0 - 1.5 %    Immature Grans % 0.4 0.0 - 0.5 %    Neutrophils, Absolute 5.01 1.70 - 7.00 10*3/mm3    Lymphocytes, Absolute 1.15 0.70 - 3.10 10*3/mm3    Monocytes, Absolute 0.33 0.10 - 0.90 10*3/mm3    Eosinophils, Absolute 0.25 0.00 - 0.40 10*3/mm3    Basophils, Absolute 0.04 0.00 - 0.20 10*3/mm3    Immature Grans, Absolute 0.03 0.00 - 0.05 10*3/mm3    nRBC 0.0 0.0 - 0.2 /100 WBC   Urine Drug Screen - Urine, Clean Catch    Collection Time: 07/23/20  2:05 PM   Result Value Ref Range    Amphet/Methamphet, Screen Negative Negative    Barbiturates Screen, Urine Negative Negative    Benzodiazepine Screen, Urine Positive (A) Negative    Cocaine Screen, Urine Negative Negative    Opiate Screen Positive (A) Negative    THC, Screen, Urine Negative Negative    Methadone Screen, Urine Negative Negative    Oxycodone Screen, Urine Positive (A) Negative   Urinalysis With Microscopic If Indicated (No Culture) - Urine, Clean Catch    Collection Time: 07/23/20  2:05 PM   Result Value Ref Range    Color, UA Yellow Yellow, Straw    Appearance, UA Clear Clear    pH, UA 8.5 (H) 5.0  - 8.0    Specific Gravity, UA 1.020 1.005 - 1.030    Glucose, UA Negative Negative    Ketones, UA Negative Negative    Bilirubin, UA Negative Negative    Blood, UA Negative Negative    Protein, UA Negative Negative    Leuk Esterase, UA Negative Negative    Nitrite, UA Negative Negative    Urobilinogen, UA 0.2 E.U./dL 0.2 - 1.0 E.U./dL   Pregnancy, Urine - Urine, Clean Catch    Collection Time: 07/23/20  2:05 PM   Result Value Ref Range    HCG, Urine QL Negative Negative       Ordered the above labs and independently reviewed the results.        RADIOLOGY  No Radiology Exams Resulted Within Past 24 Hours    I ordered the above noted radiological studies. Reviewed by me and discussed with radiologist.  See dictation for official radiology interpretation.      PROCEDURES    Procedures        MEDICATIONS GIVEN IN ER    Medications - No data to display      PROGRESS, DATA ANALYSIS, CONSULTS, AND MEDICAL DECISION MAKING        All labs have been independently reviewed by me.  All radiology studies have been reviewed by me and discussed with radiologist dictating the report.   EKG's independently viewed and interpreted by me.  Discussion below represents my analysis of pertinent findings related to patient's condition, differential diagnosis, treatment plan and final disposition.      ED Course as of Jul 23 1500   Thu Jul 23, 2020   1451 Stable anemia.  No other acute life threat identified.  Await psychiatric evaluation.   Hemoglobin(!): 10.9 [RS]   1456 Patient stable at this point and awaiting psychiatric evaluation.  72-hour hold initiated, and care transferred to Dr. Horne who is agreeable to follow-up on psychiatric recommendations.    [RS]      ED Course User Index  [RS] Stas Arana MD       AS OF 15:00 VITALS:    BP - 147/82  HR - 106  TEMP - 98.6 °F (37 °C)  O2 SATS - 99%        DIAGNOSIS  Final diagnoses:   Suicidal ideation   Narcotic dependence (CMS/[facility])   Long term prescription benzodiazepine use          DISPOSITION  Pending     Stas Arana MD  07/23/20 1500
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
## 2020-07-23 NOTE — NURSING NOTE
Calls made to [facility] and [facility] to inquire about bed availability. Facilities state that there are no beds available at this time.
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
## 2020-07-23 NOTE — ED NOTES
Pt calm and cooperative this nurse. Answers all questions appropriately and denies any pain or discomfort at this time.     Ainsley Shen, RN  07/23/20 1479
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
## 2020-07-23 NOTE — NURSING NOTE
Pt's records faxed over to [facility]. They will review with their physician and let AC know if they are willing to accept pt at this time.

## 2020-07-24 VITALS
BODY MASS INDEX: 43.28 KG/M2 | SYSTOLIC BLOOD PRESSURE: 153 MMHG | HEIGHT: 64 IN | TEMPERATURE: 99 F | RESPIRATION RATE: 16 BRPM | DIASTOLIC BLOOD PRESSURE: 87 MMHG | OXYGEN SATURATION: 99 % | HEART RATE: 84 BPM | WEIGHT: 253.53 LBS

## 2020-07-24 LAB

## 2020-07-24 PROCEDURE — 0202U NFCT DS 22 TRGT SARS-COV-2: CPT | Performed by: EMERGENCY MEDICINE

## 2020-07-24 RX ORDER — ACETAMINOPHEN 500 MG
1000 TABLET ORAL ONCE
Status: COMPLETED | OUTPATIENT
Start: 2020-07-24 | End: 2020-07-24

## 2020-07-24 RX ADMIN — ACETAMINOPHEN 1000 MG: 500 TABLET, FILM COATED ORAL at 09:25

## 2020-07-24 RX ADMIN — TERAZOSIN HYDROCHLORIDE 5 MG: 5 CAPSULE ORAL at 00:06

## 2020-07-24 NOTE — ED NOTES
Per Shane in access, pt will be getting a bed at [facility] but not until they discharge a pt, which will be approx 9am.      Dina Sne RN  07/24/20 0701

## 2020-07-24 NOTE — ED NOTES
"Pt resting quietly, this nurse asked pt if she felt she was ready to try getting swabbed for Covid test, pt states \"not yet, let's wait a little while longer.\"     Dina Sen RN  07/23/20 3722    "

## 2020-07-24 NOTE — ED NOTES
This RN made contact with [facility] who stated they would be able to transfer patient to [facility] at 11:15.     Annie Ballesteros RN  07/24/20 0490

## 2020-07-24 NOTE — ED NOTES
Pt updated on status, per Shane in intake, trying to get pt admitted here d/t unable to find an accepting outside facility. Pt is calm and cooperative, pleasant. Apologizes for not wanting to take the Covid test, tearfully stating it is too emotionally traumatic for her unless she is sedated. Reassurance given to pt. Sitter remains at bedside. Pt given another sandwich, denies any other needs at this time.     Dina Sen, RN  07/23/20 0432
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
## 2020-07-24 NOTE — ED NOTES
This RN spoke with Azucena at [facility]. Azucena advised that patient did have an open bed at this time and is able to be transferred. Pt is going to the [address] [place] [address] location. Primary RN made aware, and EMS has been contacted for transfer at this time.      Sabina Lopez RN  07/24/20 1012

## 2020-09-23 ENCOUNTER — HOSPITAL ENCOUNTER (EMERGENCY)
Facility: HOSPITAL | Age: 45
Discharge: PSYCHIATRIC OR SUBSTANCE USE FACILITY | End: 2020-09-23
Attending: EMERGENCY MEDICINE

## 2020-09-23 ENCOUNTER — HOSPITAL ENCOUNTER (INPATIENT)
Facility: HOSPITAL | Age: 45
LOS: 9 days | Discharge: HOME | End: 2020-10-02
Attending: SPECIALIST | Admitting: SPECIALIST

## 2020-09-23 VITALS
RESPIRATION RATE: 18 BRPM | OXYGEN SATURATION: 99 % | BODY MASS INDEX: 40.97 KG/M2 | HEIGHT: 64 IN | HEART RATE: 74 BPM | WEIGHT: 240 LBS | SYSTOLIC BLOOD PRESSURE: 137 MMHG | TEMPERATURE: 97.8 F | DIASTOLIC BLOOD PRESSURE: 81 MMHG

## 2020-09-23 DIAGNOSIS — F32.A DEPRESSION WITH SUICIDAL IDEATION: Primary | ICD-10-CM

## 2020-09-23 DIAGNOSIS — R45.851 DEPRESSION WITH SUICIDAL IDEATION: Primary | ICD-10-CM

## 2020-09-23 PROBLEM — F32.9 MAJOR DEPRESSIVE DISORDER: Status: ACTIVE | Noted: 2020-09-23

## 2020-09-23 LAB
AMPHET+METHAMPHET UR QL: NEGATIVE
B PARAPERT DNA SPEC QL NAA+PROBE: NOT DETECTED
B PERT DNA SPEC QL NAA+PROBE: NOT DETECTED
BARBITURATES UR QL SCN: NEGATIVE
BENZODIAZ UR QL SCN: POSITIVE
C PNEUM DNA NPH QL NAA+NON-PROBE: NOT DETECTED
CANNABINOIDS SERPL QL: NEGATIVE
COCAINE UR QL: NEGATIVE
ETHANOL BLD-MCNC: <10 MG/DL (ref 0–10)
ETHANOL UR QL: <0.01 %
FLUAV H1 2009 PAND RNA NPH QL NAA+PROBE: NOT DETECTED
FLUAV H1 HA GENE NPH QL NAA+PROBE: NOT DETECTED
FLUAV H3 RNA NPH QL NAA+PROBE: NOT DETECTED
FLUAV SUBTYP SPEC NAA+PROBE: NOT DETECTED
FLUBV RNA ISLT QL NAA+PROBE: NOT DETECTED
HADV DNA SPEC NAA+PROBE: NOT DETECTED
HCOV 229E RNA SPEC QL NAA+PROBE: NOT DETECTED
HCOV HKU1 RNA SPEC QL NAA+PROBE: NOT DETECTED
HCOV NL63 RNA SPEC QL NAA+PROBE: NOT DETECTED
HCOV OC43 RNA SPEC QL NAA+PROBE: NOT DETECTED
HMPV RNA NPH QL NAA+NON-PROBE: NOT DETECTED
HOLD SPECIMEN: NORMAL
HOLD SPECIMEN: NORMAL
HPIV1 RNA SPEC QL NAA+PROBE: NOT DETECTED
HPIV2 RNA SPEC QL NAA+PROBE: NOT DETECTED
HPIV3 RNA NPH QL NAA+PROBE: NOT DETECTED
HPIV4 P GENE NPH QL NAA+PROBE: NOT DETECTED
M PNEUMO IGG SER IA-ACNC: NOT DETECTED
METHADONE UR QL SCN: NEGATIVE
OPIATES UR QL: NEGATIVE
OXYCODONE UR QL SCN: NEGATIVE
RHINOVIRUS RNA SPEC NAA+PROBE: NOT DETECTED
RSV RNA NPH QL NAA+NON-PROBE: NOT DETECTED
SARS-COV-2 RNA NPH QL NAA+NON-PROBE: NOT DETECTED
WHOLE BLOOD HOLD SPECIMEN: NORMAL

## 2020-09-23 PROCEDURE — 90791 PSYCH DIAGNOSTIC EVALUATION: CPT

## 2020-09-23 PROCEDURE — C9803 HOPD COVID-19 SPEC COLLECT: HCPCS | Performed by: EMERGENCY MEDICINE

## 2020-09-23 PROCEDURE — 80061 LIPID PANEL: CPT | Performed by: SPECIALIST

## 2020-09-23 PROCEDURE — 84443 ASSAY THYROID STIM HORMONE: CPT | Performed by: SPECIALIST

## 2020-09-23 PROCEDURE — 80053 COMPREHEN METABOLIC PANEL: CPT | Performed by: SPECIALIST

## 2020-09-23 PROCEDURE — 80307 DRUG TEST PRSMV CHEM ANLYZR: CPT | Performed by: EMERGENCY MEDICINE

## 2020-09-23 PROCEDURE — 85027 COMPLETE CBC AUTOMATED: CPT | Performed by: SPECIALIST

## 2020-09-23 PROCEDURE — 99284 EMERGENCY DEPT VISIT MOD MDM: CPT

## 2020-09-23 PROCEDURE — 0202U NFCT DS 22 TRGT SARS-COV-2: CPT | Performed by: EMERGENCY MEDICINE

## 2020-09-23 RX ORDER — ALPRAZOLAM 0.5 MG/1
0.5 TABLET ORAL 2 TIMES DAILY PRN
Status: DISCONTINUED | OUTPATIENT
Start: 2020-09-23 | End: 2020-09-25

## 2020-09-23 RX ORDER — HYDROCODONE BITARTRATE AND ACETAMINOPHEN 7.5; 325 MG/1; MG/1
1 TABLET ORAL 3 TIMES DAILY
Status: DISCONTINUED | OUTPATIENT
Start: 2020-09-23 | End: 2020-09-25

## 2020-09-23 RX ORDER — LOPERAMIDE HYDROCHLORIDE 2 MG/1
2 CAPSULE ORAL
Status: DISCONTINUED | OUTPATIENT
Start: 2020-09-23 | End: 2020-10-02 | Stop reason: HOSPADM

## 2020-09-23 RX ORDER — ACETAMINOPHEN 325 MG/1
650 TABLET ORAL EVERY 4 HOURS PRN
Status: DISCONTINUED | OUTPATIENT
Start: 2020-09-23 | End: 2020-10-02 | Stop reason: HOSPADM

## 2020-09-23 RX ORDER — FAMOTIDINE 40 MG/1
40 TABLET, FILM COATED ORAL NIGHTLY
Status: DISCONTINUED | OUTPATIENT
Start: 2020-09-23 | End: 2020-10-02 | Stop reason: HOSPADM

## 2020-09-23 RX ORDER — ALPRAZOLAM 1 MG/1
1 TABLET ORAL NIGHTLY PRN
Status: DISCONTINUED | OUTPATIENT
Start: 2020-09-23 | End: 2020-09-25

## 2020-09-23 RX ORDER — PRAZOSIN HYDROCHLORIDE 5 MG/1
5 CAPSULE ORAL NIGHTLY
Status: DISCONTINUED | OUTPATIENT
Start: 2020-09-23 | End: 2020-09-24

## 2020-09-23 RX ORDER — ALUMINA, MAGNESIA, AND SIMETHICONE 2400; 2400; 240 MG/30ML; MG/30ML; MG/30ML
15 SUSPENSION ORAL EVERY 6 HOURS PRN
Status: DISCONTINUED | OUTPATIENT
Start: 2020-09-23 | End: 2020-10-02 | Stop reason: HOSPADM

## 2020-09-23 RX ORDER — DESVENLAFAXINE SUCCINATE 50 MG/1
100 TABLET, EXTENDED RELEASE ORAL NIGHTLY
Status: DISCONTINUED | OUTPATIENT
Start: 2020-09-23 | End: 2020-10-02 | Stop reason: HOSPADM

## 2020-09-23 RX ORDER — ONDANSETRON 4 MG/1
4 TABLET, FILM COATED ORAL EVERY 6 HOURS PRN
Status: DISCONTINUED | OUTPATIENT
Start: 2020-09-23 | End: 2020-10-02 | Stop reason: HOSPADM

## 2020-09-23 RX ORDER — BACLOFEN 10 MG/1
10 TABLET ORAL 3 TIMES DAILY
Status: DISPENSED | OUTPATIENT
Start: 2020-09-23 | End: 2020-09-24

## 2020-09-23 RX ADMIN — FAMOTIDINE 40 MG: 40 TABLET, FILM COATED ORAL at 23:17

## 2020-09-23 RX ADMIN — DESVENLAFAXINE SUCCINATE 100 MG: 50 TABLET, EXTENDED RELEASE ORAL at 23:16

## 2020-09-23 RX ADMIN — ALPRAZOLAM 1 MG: 1 TABLET ORAL at 23:21

## 2020-09-23 RX ADMIN — BACLOFEN 10 MG: 10 TABLET ORAL at 23:16

## 2020-09-23 RX ADMIN — PRAZOSIN HYDROCHLORIDE 5 MG: 5 CAPSULE ORAL at 23:16

## 2020-09-23 RX ADMIN — HYDROCODONE BITARTRATE AND ACETAMINOPHEN 1 TABLET: 7.5; 325 TABLET ORAL at 23:16

## 2020-09-24 RX ORDER — ALPRAZOLAM 1 MG/1
1 TABLET ORAL NIGHTLY
Status: DISCONTINUED | OUTPATIENT
Start: 2020-09-24 | End: 2020-10-02 | Stop reason: HOSPADM

## 2020-09-24 RX ORDER — ALPRAZOLAM 0.5 MG/1
0.5 TABLET ORAL 3 TIMES DAILY PRN
Status: DISCONTINUED | OUTPATIENT
Start: 2020-09-24 | End: 2020-09-25

## 2020-09-24 RX ORDER — LISINOPRIL 10 MG/1
10 TABLET ORAL
Status: DISCONTINUED | OUTPATIENT
Start: 2020-09-25 | End: 2020-10-02 | Stop reason: HOSPADM

## 2020-09-24 RX ORDER — OXYCODONE AND ACETAMINOPHEN 7.5; 325 MG/1; MG/1
1 TABLET ORAL EVERY 6 HOURS PRN
Status: DISCONTINUED | OUTPATIENT
Start: 2020-09-24 | End: 2020-09-25

## 2020-09-24 RX ORDER — DICYCLOMINE HYDROCHLORIDE 10 MG/1
20 CAPSULE ORAL 4 TIMES DAILY PRN
Status: DISCONTINUED | OUTPATIENT
Start: 2020-09-24 | End: 2020-10-02 | Stop reason: HOSPADM

## 2020-09-24 RX ORDER — FAMOTIDINE 40 MG/1
40 TABLET, FILM COATED ORAL NIGHTLY
Status: DISCONTINUED | OUTPATIENT
Start: 2020-09-24 | End: 2020-09-24 | Stop reason: SDUPTHER

## 2020-09-24 RX ORDER — ARIPIPRAZOLE 2 MG/1
2 TABLET ORAL DAILY
Status: DISCONTINUED | OUTPATIENT
Start: 2020-09-24 | End: 2020-09-26

## 2020-09-24 RX ORDER — ONDANSETRON 4 MG/1
4 TABLET, FILM COATED ORAL EVERY 8 HOURS PRN
Status: DISCONTINUED | OUTPATIENT
Start: 2020-09-24 | End: 2020-10-02 | Stop reason: HOSPADM

## 2020-09-24 RX ORDER — BACLOFEN 10 MG/1
10 TABLET ORAL 3 TIMES DAILY
Status: DISCONTINUED | OUTPATIENT
Start: 2020-09-24 | End: 2020-10-02 | Stop reason: HOSPADM

## 2020-09-24 RX ORDER — PRAZOSIN HYDROCHLORIDE 1 MG/1
4 CAPSULE ORAL NIGHTLY
Status: DISCONTINUED | OUTPATIENT
Start: 2020-09-24 | End: 2020-09-25

## 2020-09-24 RX ADMIN — ARIPIPRAZOLE 2 MG: 2 TABLET ORAL at 16:26

## 2020-09-24 RX ADMIN — ALPRAZOLAM 0.5 MG: 0.5 TABLET ORAL at 08:57

## 2020-09-24 RX ADMIN — BACLOFEN 10 MG: 10 TABLET ORAL at 11:47

## 2020-09-24 RX ADMIN — PRAZOSIN HYDROCHLORIDE 4 MG: 5 CAPSULE ORAL at 21:07

## 2020-09-24 RX ADMIN — ALPRAZOLAM 0.5 MG: 0.5 TABLET ORAL at 16:18

## 2020-09-24 RX ADMIN — DICYCLOMINE HYDROCHLORIDE 20 MG: 10 CAPSULE ORAL at 16:27

## 2020-09-24 RX ADMIN — BACLOFEN 10 MG: 10 TABLET ORAL at 16:28

## 2020-09-24 RX ADMIN — DESVENLAFAXINE SUCCINATE 100 MG: 50 TABLET, EXTENDED RELEASE ORAL at 21:07

## 2020-09-24 RX ADMIN — HYDROCODONE BITARTRATE AND ACETAMINOPHEN 1 TABLET: 7.5; 325 TABLET ORAL at 16:28

## 2020-09-24 RX ADMIN — BACLOFEN 10 MG: 10 TABLET ORAL at 21:07

## 2020-09-24 RX ADMIN — OXYCODONE HYDROCHLORIDE AND ACETAMINOPHEN 1 TABLET: 7.5; 325 TABLET ORAL at 12:05

## 2020-09-24 RX ADMIN — FAMOTIDINE 40 MG: 40 TABLET, FILM COATED ORAL at 21:07

## 2020-09-24 RX ADMIN — BACLOFEN 10 MG: 10 TABLET ORAL at 08:56

## 2020-09-24 RX ADMIN — HYDROCODONE BITARTRATE AND ACETAMINOPHEN 1 TABLET: 7.5; 325 TABLET ORAL at 08:56

## 2020-09-24 RX ADMIN — ALPRAZOLAM 1 MG: 1 TABLET ORAL at 21:08

## 2020-09-24 RX ADMIN — OXYCODONE HYDROCHLORIDE AND ACETAMINOPHEN 1 TABLET: 7.5; 325 TABLET ORAL at 21:08

## 2020-09-25 LAB
ALBUMIN SERPL-MCNC: 4.2 G/DL (ref 3.5–5.2)
ALBUMIN/GLOB SERPL: 1.6 G/DL
ALP SERPL-CCNC: 81 U/L (ref 39–117)
ALT SERPL W P-5'-P-CCNC: 13 U/L (ref 1–33)
ANION GAP SERPL CALCULATED.3IONS-SCNC: 17.5 MMOL/L (ref 5–15)
AST SERPL-CCNC: 18 U/L (ref 1–32)
BILIRUB SERPL-MCNC: 0.4 MG/DL (ref 0–1.2)
BUN SERPL-MCNC: 10 MG/DL (ref 6–20)
BUN/CREAT SERPL: 13.7 (ref 7–25)
CALCIUM SPEC-SCNC: 9.2 MG/DL (ref 8.6–10.5)
CHLORIDE SERPL-SCNC: 106 MMOL/L (ref 98–107)
CHOLEST SERPL-MCNC: 193 MG/DL (ref 0–200)
CO2 SERPL-SCNC: 19.5 MMOL/L (ref 22–29)
CREAT SERPL-MCNC: 0.73 MG/DL (ref 0.57–1)
DEPRECATED RDW RBC AUTO: 48.3 FL (ref 37–54)
ERYTHROCYTE [DISTWIDTH] IN BLOOD BY AUTOMATED COUNT: 15.1 % (ref 12.3–15.4)
GFR SERPL CREATININE-BSD FRML MDRD: 86 ML/MIN/1.73
GLOBULIN UR ELPH-MCNC: 2.6 GM/DL
GLUCOSE SERPL-MCNC: 92 MG/DL (ref 65–99)
HCT VFR BLD AUTO: 33.4 % (ref 34–46.6)
HDLC SERPL-MCNC: 36 MG/DL (ref 40–60)
HGB BLD-MCNC: 10.1 G/DL (ref 12–15.9)
LDLC SERPL CALC-MCNC: 109 MG/DL (ref 0–100)
LDLC/HDLC SERPL: 3.02 {RATIO}
MCH RBC QN AUTO: 26.2 PG (ref 26.6–33)
MCHC RBC AUTO-ENTMCNC: 30.2 G/DL (ref 31.5–35.7)
MCV RBC AUTO: 86.5 FL (ref 79–97)
PLATELET # BLD AUTO: 249 10*3/MM3 (ref 140–450)
PMV BLD AUTO: 12.2 FL (ref 6–12)
POTASSIUM SERPL-SCNC: 3.8 MMOL/L (ref 3.5–5.2)
PROT SERPL-MCNC: 6.8 G/DL (ref 6–8.5)
RBC # BLD AUTO: 3.86 10*6/MM3 (ref 3.77–5.28)
SODIUM SERPL-SCNC: 143 MMOL/L (ref 136–145)
TRIGL SERPL-MCNC: 242 MG/DL (ref 0–150)
TSH SERPL DL<=0.05 MIU/L-ACNC: 0.79 UIU/ML (ref 0.27–4.2)
VLDLC SERPL-MCNC: 48.4 MG/DL (ref 5–40)
WBC # BLD AUTO: 6.89 10*3/MM3 (ref 3.4–10.8)

## 2020-09-25 RX ORDER — OXYCODONE HYDROCHLORIDE AND ACETAMINOPHEN 5; 325 MG/1; MG/1
1 TABLET ORAL EVERY 8 HOURS PRN
Status: DISCONTINUED | OUTPATIENT
Start: 2020-09-25 | End: 2020-10-02 | Stop reason: HOSPADM

## 2020-09-25 RX ORDER — GABAPENTIN 300 MG/1
300 CAPSULE ORAL 2 TIMES DAILY
COMMUNITY
End: 2020-10-02 | Stop reason: HOSPADM

## 2020-09-25 RX ORDER — OXYCODONE HYDROCHLORIDE AND ACETAMINOPHEN 5; 325 MG/1; MG/1
1 TABLET ORAL EVERY 8 HOURS PRN
COMMUNITY

## 2020-09-25 RX ORDER — PRAZOSIN HYDROCHLORIDE 5 MG/1
5 CAPSULE ORAL NIGHTLY
Status: DISCONTINUED | OUTPATIENT
Start: 2020-09-25 | End: 2020-10-02 | Stop reason: HOSPADM

## 2020-09-25 RX ORDER — GABAPENTIN 300 MG/1
300 CAPSULE ORAL 2 TIMES DAILY
Status: DISCONTINUED | OUTPATIENT
Start: 2020-09-25 | End: 2020-09-26

## 2020-09-25 RX ORDER — DESVENLAFAXINE SUCCINATE 50 MG/1
100 TABLET, EXTENDED RELEASE ORAL NIGHTLY
Status: ON HOLD | COMMUNITY
End: 2020-10-02 | Stop reason: SDUPTHER

## 2020-09-25 RX ORDER — FLUOXETINE HYDROCHLORIDE 20 MG/1
40 CAPSULE ORAL DAILY
COMMUNITY
End: 2020-10-02 | Stop reason: HOSPADM

## 2020-09-25 RX ORDER — ALPRAZOLAM 0.5 MG/1
0.5 TABLET ORAL 2 TIMES DAILY
Status: DISCONTINUED | OUTPATIENT
Start: 2020-09-25 | End: 2020-09-26

## 2020-09-25 RX ADMIN — DICYCLOMINE HYDROCHLORIDE 20 MG: 10 CAPSULE ORAL at 08:31

## 2020-09-25 RX ADMIN — FAMOTIDINE 40 MG: 40 TABLET, FILM COATED ORAL at 23:50

## 2020-09-25 RX ADMIN — ALPRAZOLAM 0.5 MG: 0.5 TABLET ORAL at 23:51

## 2020-09-25 RX ADMIN — OXYCODONE AND ACETAMINOPHEN 1 TABLET: 5; 325 TABLET ORAL at 15:39

## 2020-09-25 RX ADMIN — GABAPENTIN 300 MG: 300 CAPSULE ORAL at 23:49

## 2020-09-25 RX ADMIN — OXYCODONE HYDROCHLORIDE AND ACETAMINOPHEN 1 TABLET: 7.5; 325 TABLET ORAL at 08:28

## 2020-09-25 RX ADMIN — BACLOFEN 10 MG: 10 TABLET ORAL at 08:20

## 2020-09-25 RX ADMIN — BACLOFEN 10 MG: 10 TABLET ORAL at 15:01

## 2020-09-25 RX ADMIN — PRAZOSIN HYDROCHLORIDE 5 MG: 5 CAPSULE ORAL at 23:50

## 2020-09-25 RX ADMIN — ALPRAZOLAM 1 MG: 1 TABLET ORAL at 23:52

## 2020-09-25 RX ADMIN — ARIPIPRAZOLE 2 MG: 2 TABLET ORAL at 08:20

## 2020-09-25 RX ADMIN — ALPRAZOLAM 0.5 MG: 0.5 TABLET ORAL at 13:27

## 2020-09-25 RX ADMIN — OXYCODONE AND ACETAMINOPHEN 1 TABLET: 5; 325 TABLET ORAL at 23:52

## 2020-09-25 RX ADMIN — LISINOPRIL 10 MG: 10 TABLET ORAL at 08:20

## 2020-09-25 RX ADMIN — BACLOFEN 10 MG: 10 TABLET ORAL at 23:51

## 2020-09-25 RX ADMIN — DESVENLAFAXINE SUCCINATE 100 MG: 50 TABLET, EXTENDED RELEASE ORAL at 23:50

## 2020-09-26 RX ORDER — ARIPIPRAZOLE 5 MG/1
5 TABLET ORAL DAILY
Status: DISCONTINUED | OUTPATIENT
Start: 2020-09-27 | End: 2020-10-02 | Stop reason: HOSPADM

## 2020-09-26 RX ORDER — ALPRAZOLAM 0.5 MG/1
0.5 TABLET ORAL EVERY 6 HOURS PRN
Status: DISCONTINUED | OUTPATIENT
Start: 2020-09-26 | End: 2020-10-02 | Stop reason: HOSPADM

## 2020-09-26 RX ADMIN — DESVENLAFAXINE SUCCINATE 100 MG: 50 TABLET, EXTENDED RELEASE ORAL at 23:39

## 2020-09-26 RX ADMIN — GABAPENTIN 300 MG: 300 CAPSULE ORAL at 08:30

## 2020-09-26 RX ADMIN — ALPRAZOLAM 0.5 MG: 0.5 TABLET ORAL at 15:06

## 2020-09-26 RX ADMIN — ALPRAZOLAM 0.5 MG: 0.5 TABLET ORAL at 08:30

## 2020-09-26 RX ADMIN — BACLOFEN 10 MG: 10 TABLET ORAL at 16:29

## 2020-09-26 RX ADMIN — PRAZOSIN HYDROCHLORIDE 5 MG: 5 CAPSULE ORAL at 23:38

## 2020-09-26 RX ADMIN — BACLOFEN 10 MG: 10 TABLET ORAL at 09:07

## 2020-09-26 RX ADMIN — LISINOPRIL 10 MG: 10 TABLET ORAL at 09:07

## 2020-09-26 RX ADMIN — ALPRAZOLAM 1 MG: 1 TABLET ORAL at 23:39

## 2020-09-26 RX ADMIN — DICYCLOMINE HYDROCHLORIDE 20 MG: 10 CAPSULE ORAL at 16:29

## 2020-09-26 RX ADMIN — FAMOTIDINE 40 MG: 40 TABLET, FILM COATED ORAL at 23:38

## 2020-09-26 RX ADMIN — OXYCODONE AND ACETAMINOPHEN 1 TABLET: 5; 325 TABLET ORAL at 08:30

## 2020-09-26 RX ADMIN — OXYCODONE AND ACETAMINOPHEN 1 TABLET: 5; 325 TABLET ORAL at 16:29

## 2020-09-26 RX ADMIN — OXYCODONE AND ACETAMINOPHEN 1 TABLET: 5; 325 TABLET ORAL at 23:37

## 2020-09-26 RX ADMIN — LOPERAMIDE HYDROCHLORIDE 2 MG: 2 CAPSULE ORAL at 15:06

## 2020-09-26 RX ADMIN — ARIPIPRAZOLE 2 MG: 2 TABLET ORAL at 09:07

## 2020-09-26 RX ADMIN — BACLOFEN 10 MG: 10 TABLET ORAL at 23:39

## 2020-09-27 RX ADMIN — ARIPIPRAZOLE 5 MG: 5 TABLET ORAL at 08:52

## 2020-09-27 RX ADMIN — ALPRAZOLAM 1 MG: 1 TABLET ORAL at 23:44

## 2020-09-27 RX ADMIN — LOPERAMIDE HYDROCHLORIDE 2 MG: 2 CAPSULE ORAL at 16:36

## 2020-09-27 RX ADMIN — OXYCODONE AND ACETAMINOPHEN 1 TABLET: 5; 325 TABLET ORAL at 23:43

## 2020-09-27 RX ADMIN — DICYCLOMINE HYDROCHLORIDE 20 MG: 10 CAPSULE ORAL at 16:34

## 2020-09-27 RX ADMIN — OXYCODONE AND ACETAMINOPHEN 1 TABLET: 5; 325 TABLET ORAL at 16:35

## 2020-09-27 RX ADMIN — BACLOFEN 10 MG: 10 TABLET ORAL at 08:52

## 2020-09-27 RX ADMIN — LISINOPRIL 10 MG: 10 TABLET ORAL at 08:53

## 2020-09-27 RX ADMIN — FAMOTIDINE 40 MG: 40 TABLET, FILM COATED ORAL at 23:43

## 2020-09-27 RX ADMIN — DESVENLAFAXINE SUCCINATE 100 MG: 50 TABLET, EXTENDED RELEASE ORAL at 23:43

## 2020-09-27 RX ADMIN — ALPRAZOLAM 0.5 MG: 0.5 TABLET ORAL at 08:55

## 2020-09-27 RX ADMIN — BACLOFEN 10 MG: 10 TABLET ORAL at 16:36

## 2020-09-27 RX ADMIN — ALPRAZOLAM 0.5 MG: 0.5 TABLET ORAL at 16:35

## 2020-09-27 RX ADMIN — OXYCODONE AND ACETAMINOPHEN 1 TABLET: 5; 325 TABLET ORAL at 08:54

## 2020-09-27 RX ADMIN — PRAZOSIN HYDROCHLORIDE 5 MG: 5 CAPSULE ORAL at 23:38

## 2020-09-28 RX ADMIN — ALPRAZOLAM 0.5 MG: 0.5 TABLET ORAL at 17:17

## 2020-09-28 RX ADMIN — LISINOPRIL 10 MG: 10 TABLET ORAL at 08:47

## 2020-09-28 RX ADMIN — OXYCODONE AND ACETAMINOPHEN 1 TABLET: 5; 325 TABLET ORAL at 08:48

## 2020-09-28 RX ADMIN — ALPRAZOLAM 1 MG: 1 TABLET ORAL at 21:22

## 2020-09-28 RX ADMIN — PRAZOSIN HYDROCHLORIDE 5 MG: 5 CAPSULE ORAL at 21:22

## 2020-09-28 RX ADMIN — DESVENLAFAXINE SUCCINATE 100 MG: 50 TABLET, EXTENDED RELEASE ORAL at 21:21

## 2020-09-28 RX ADMIN — BACLOFEN 10 MG: 10 TABLET ORAL at 21:21

## 2020-09-28 RX ADMIN — BACLOFEN 10 MG: 10 TABLET ORAL at 17:17

## 2020-09-28 RX ADMIN — BACLOFEN 10 MG: 10 TABLET ORAL at 08:47

## 2020-09-28 RX ADMIN — ALPRAZOLAM 0.5 MG: 0.5 TABLET ORAL at 09:15

## 2020-09-28 RX ADMIN — OXYCODONE AND ACETAMINOPHEN 1 TABLET: 5; 325 TABLET ORAL at 17:17

## 2020-09-28 RX ADMIN — ARIPIPRAZOLE 5 MG: 5 TABLET ORAL at 08:47

## 2020-09-28 RX ADMIN — BACLOFEN 10 MG: 10 TABLET ORAL at 00:13

## 2020-09-28 RX ADMIN — FAMOTIDINE 40 MG: 40 TABLET, FILM COATED ORAL at 21:22

## 2020-09-29 RX ADMIN — LISINOPRIL 10 MG: 10 TABLET ORAL at 08:24

## 2020-09-29 RX ADMIN — PRAZOSIN HYDROCHLORIDE 5 MG: 5 CAPSULE ORAL at 21:42

## 2020-09-29 RX ADMIN — DESVENLAFAXINE SUCCINATE 100 MG: 50 TABLET, EXTENDED RELEASE ORAL at 21:42

## 2020-09-29 RX ADMIN — OXYCODONE AND ACETAMINOPHEN 1 TABLET: 5; 325 TABLET ORAL at 17:22

## 2020-09-29 RX ADMIN — BACLOFEN 10 MG: 10 TABLET ORAL at 17:22

## 2020-09-29 RX ADMIN — ALPRAZOLAM 0.5 MG: 0.5 TABLET ORAL at 08:24

## 2020-09-29 RX ADMIN — ALPRAZOLAM 0.5 MG: 0.5 TABLET ORAL at 17:22

## 2020-09-29 RX ADMIN — ALPRAZOLAM 1 MG: 1 TABLET ORAL at 21:43

## 2020-09-29 RX ADMIN — BACLOFEN 10 MG: 10 TABLET ORAL at 21:42

## 2020-09-29 RX ADMIN — ARIPIPRAZOLE 5 MG: 5 TABLET ORAL at 08:23

## 2020-09-29 RX ADMIN — FAMOTIDINE 40 MG: 40 TABLET, FILM COATED ORAL at 21:42

## 2020-09-29 RX ADMIN — BACLOFEN 10 MG: 10 TABLET ORAL at 08:24

## 2020-09-29 RX ADMIN — OXYCODONE AND ACETAMINOPHEN 1 TABLET: 5; 325 TABLET ORAL at 08:24

## 2020-09-30 RX ADMIN — BACLOFEN 10 MG: 10 TABLET ORAL at 16:36

## 2020-09-30 RX ADMIN — FAMOTIDINE 40 MG: 40 TABLET, FILM COATED ORAL at 21:21

## 2020-09-30 RX ADMIN — PRAZOSIN HYDROCHLORIDE 5 MG: 5 CAPSULE ORAL at 21:20

## 2020-09-30 RX ADMIN — BACLOFEN 10 MG: 10 TABLET ORAL at 08:32

## 2020-09-30 RX ADMIN — OXYCODONE AND ACETAMINOPHEN 1 TABLET: 5; 325 TABLET ORAL at 05:08

## 2020-09-30 RX ADMIN — OXYCODONE AND ACETAMINOPHEN 1 TABLET: 5; 325 TABLET ORAL at 21:20

## 2020-09-30 RX ADMIN — LISINOPRIL 10 MG: 10 TABLET ORAL at 08:32

## 2020-09-30 RX ADMIN — ALPRAZOLAM 0.5 MG: 0.5 TABLET ORAL at 13:11

## 2020-09-30 RX ADMIN — OXYCODONE AND ACETAMINOPHEN 1 TABLET: 5; 325 TABLET ORAL at 13:11

## 2020-09-30 RX ADMIN — ARIPIPRAZOLE 5 MG: 5 TABLET ORAL at 08:32

## 2020-09-30 RX ADMIN — DICYCLOMINE HYDROCHLORIDE 20 MG: 10 CAPSULE ORAL at 05:08

## 2020-09-30 RX ADMIN — ALPRAZOLAM 1 MG: 1 TABLET ORAL at 21:20

## 2020-09-30 RX ADMIN — BACLOFEN 10 MG: 10 TABLET ORAL at 21:20

## 2020-09-30 RX ADMIN — DESVENLAFAXINE SUCCINATE 100 MG: 50 TABLET, EXTENDED RELEASE ORAL at 21:20

## 2020-10-01 RX ADMIN — ALPRAZOLAM 0.5 MG: 0.5 TABLET ORAL at 06:54

## 2020-10-01 RX ADMIN — ALPRAZOLAM 1 MG: 1 TABLET ORAL at 23:06

## 2020-10-01 RX ADMIN — DESVENLAFAXINE SUCCINATE 100 MG: 50 TABLET, EXTENDED RELEASE ORAL at 23:03

## 2020-10-01 RX ADMIN — BACLOFEN 10 MG: 10 TABLET ORAL at 15:58

## 2020-10-01 RX ADMIN — BACLOFEN 10 MG: 10 TABLET ORAL at 08:40

## 2020-10-01 RX ADMIN — OXYCODONE AND ACETAMINOPHEN 1 TABLET: 5; 325 TABLET ORAL at 15:36

## 2020-10-01 RX ADMIN — OXYCODONE AND ACETAMINOPHEN 1 TABLET: 5; 325 TABLET ORAL at 06:54

## 2020-10-01 RX ADMIN — ALPRAZOLAM 0.5 MG: 0.5 TABLET ORAL at 15:36

## 2020-10-01 RX ADMIN — OXYCODONE AND ACETAMINOPHEN 1 TABLET: 5; 325 TABLET ORAL at 23:03

## 2020-10-01 RX ADMIN — LISINOPRIL 10 MG: 10 TABLET ORAL at 08:40

## 2020-10-01 RX ADMIN — BACLOFEN 10 MG: 10 TABLET ORAL at 23:02

## 2020-10-01 RX ADMIN — PRAZOSIN HYDROCHLORIDE 5 MG: 5 CAPSULE ORAL at 23:02

## 2020-10-01 RX ADMIN — ARIPIPRAZOLE 5 MG: 5 TABLET ORAL at 08:40

## 2020-10-01 NOTE — PLAN OF CARE
"  Problem: Adult Behavioral Health Plan of Care  Goal: Plan of Care Review  Outcome: Ongoing, Progressing  Flowsheets  Taken 10/1/2020 1441  Progress: no change  Plan of Care Reviewed With: patient  Patient Agreement with Plan of Care: agrees  Outcome Summary: Patient is anxious and depressed today, stating that she slept poorly overnight with dreams about \"jumping in front of a car\".  Continues to endorse sucidal thoughts today.  She is compliant with medications and attends groups.  Taken 10/1/2020 0916  Plan of Care Reviewed With: patient  Patient Agreement with Plan of Care: agrees  Goal: Patient-Specific Goal (Individualization)  Outcome: Ongoing, Progressing  Goal: Adheres to Safety Considerations for Self and Others  Outcome: Ongoing, Progressing  Flowsheets  Taken 10/1/2020 1400  Safety Measures: safety rounds completed  Taken 10/1/2020 1200  Safety Measures: safety rounds completed  Taken 10/1/2020 1000  Safety Measures: safety rounds completed  Taken 10/1/2020 0800  Safety Measures: safety rounds completed  Intervention: Develop and Maintain Individualized Safety Plan  Recent Flowsheet Documentation  Taken 10/1/2020 1400 by Subhash Gautam RN  Safety Measures: safety rounds completed  Taken 10/1/2020 1200 by Subhash Gautam RN  Safety Measures: safety rounds completed  Taken 10/1/2020 1000 by Subhash Gautam RN  Safety Measures: safety rounds completed  Taken 10/1/2020 0800 by Subhash aGutam RN  Safety Measures: safety rounds completed  Goal: Absence of New-Onset Illness or Injury  Outcome: Ongoing, Progressing  Intervention: Identify and Manage Fall Risk  Recent Flowsheet Documentation  Taken 10/1/2020 1400 by Subhash Gautam RN  Safety Measures: safety rounds completed  Taken 10/1/2020 1200 by Subhash Gautam RN  Safety Measures: safety rounds completed  Taken 10/1/2020 1000 by Subhash Gautam RN  Safety Measures: safety rounds completed  Taken 10/1/2020 0800 by Subhash Gautam RN  Safety Measures: safety rounds " completed  Goal: Optimized Coping Skills in Response to Life Stressors  Outcome: Ongoing, Progressing  Intervention: Promote Effective Coping Strategies  Recent Flowsheet Documentation  Taken 10/1/2020 0916 by Subhash Gautam RN  Supportive Measures:   active listening utilized   positive reinforcement provided   relaxation techniques promoted   self-care encouraged   self-reflection promoted   self-responsibility promoted   verbalization of feelings encouraged  Goal: Develops/Participates in Therapeutic [place] to Support Successful Transition  Outcome: Ongoing, Progressing  Intervention: Foster Therapeutic [place]  Recent Flowsheet Documentation  Taken 10/1/2020 0916 by Subhash Gautam RN  Trust Relationship/Rapport:   care explained   choices provided   emotional support provided   empathic listening provided   questions answered   questions encouraged   reassurance provided   thoughts/feelings acknowledged  Intervention: Mutually Develop Transition Plan  Recent Flowsheet Documentation  Taken 10/1/2020 0916 by Subhash Gautam RN  Transition Support: crisis management plan promoted     Problem: Suicidal Behavior  Goal: Suicidal Behavior is Absent or Managed  Outcome: Ongoing, Progressing     Problem: Pain Chronic (Persistent)  Goal: Acceptable Pain Control and Functional Ability  Outcome: Ongoing, Progressing  Intervention: Manage Persistent Pain  Recent Flowsheet Documentation  Taken 10/1/2020 0916 by Subhash Gautam RN  Sleep/Rest Enhancement:   awakenings minimized   consistent schedule promoted   noise level reduced   regular sleep/rest pattern promoted   relaxation techniques promoted  Intervention: Optimize Psychosocial Wellbeing  Recent Flowsheet Documentation  Taken 10/1/2020 0916 by Subhash Gautam RN  Supportive Measures:   active listening utilized   positive reinforcement provided   relaxation techniques promoted   self-care encouraged   self-reflection promoted   self-responsibility promoted   verbalization  "of feelings encouraged   Goal Outcome Evaluation:  Plan of Care Reviewed With: patient  Progress: no change  Outcome Summary: Patient is anxious and depressed today, stating that she slept poorly overnight with dreams about \"jumping in front of a car\".  Continues to endorse sucidal thoughts today.  She is compliant with medications and attends groups.  "

## 2020-10-01 NOTE — PROGRESS NOTES
The patient continues to complain of feeling overwhelmed and depressed.  She continues to endorse suicidal ideation.  She complains of ongoing nightmares which have thus far not been addressed with addition of prazosin to her pharmacotherapeutic regimen.  I have encouraged her to continue active participation within the therapeutic milieu.

## 2020-10-01 NOTE — PLAN OF CARE
Goal Outcome Evaluation:  Plan of Care Reviewed With: patient  Progress: no change  Outcome Summary: Patient has been visable in the miliiue this shift. Patien report having a bad day with her family and having a lot of stuff going on at home. Patient was able to state some coping skilsl she could use at home. Patient reports still feeling overwhemled by problems at home. Will continue to monitor.  Problem: Adult Behavioral Health Plan of Care  Goal: Plan of Care Review  Outcome: Ongoing, Progressing  Flowsheets  Taken 10/1/2020 0521 by Tere Alarcon RN  Outcome Summary: Patient has been visible in the milieu this shift. Patient report having a bad day with her family and having a lot of stuff going on at home. Patient was able to state some coping skills she could use at home. Patient reports still feeling overwhelmed by problems at home. Will continue to monitor.  Taken 9/30/2020 2230 by Tere Alarcon RN  Plan of Care Reviewed With: patient  Patient Agreement with Plan of Care: agrees  Taken 9/30/2020 1746 by Subhash Gautam RN  Progress: no change     Problem: Adult Behavioral Health Plan of Care  Goal: Adheres to Safety Considerations for Self and Others  Outcome: Ongoing, Progressing  Flowsheets  Taken 9/30/2020 2202  Safety Measures: safety rounds completed  Taken 9/30/2020 2000  Safety Measures: safety rounds completed  Intervention: Develop and Maintain Individualized Safety Plan  Recent Flowsheet Documentation  Taken 9/30/2020 2202 by Tere Alarcon RN  Safety Measures: safety rounds completed  Taken 9/30/2020 2000 by Tere Alarcon RN  Safety Measures: safety rounds completed     Problem: Adult Behavioral Health Plan of Care  Goal: Adheres to Safety Considerations for Self and Others  Intervention: Develop and Maintain Individualized Safety Plan  Recent Flowsheet Documentation  Taken 9/30/2020 2202 by Tere Alarcon RN  Safety Measures:  safety rounds completed  Taken 9/30/2020 2000 by Tere Alarcon RN  Safety Measures: safety rounds completed

## 2020-10-02 VITALS
OXYGEN SATURATION: 94 % | DIASTOLIC BLOOD PRESSURE: 78 MMHG | SYSTOLIC BLOOD PRESSURE: 118 MMHG | RESPIRATION RATE: 18 BRPM | TEMPERATURE: 97.3 F | HEART RATE: 94 BPM

## 2020-10-02 RX ORDER — DESVENLAFAXINE 100 MG/1
100 TABLET, EXTENDED RELEASE ORAL NIGHTLY
Qty: 30 TABLET | Refills: 1 | Status: SHIPPED | OUTPATIENT
Start: 2020-10-02

## 2020-10-02 RX ORDER — ALPRAZOLAM 1 MG/1
1 TABLET ORAL NIGHTLY
Qty: 5 TABLET | Refills: 0 | Status: SHIPPED | OUTPATIENT
Start: 2020-10-02

## 2020-10-02 RX ORDER — PRAZOSIN HYDROCHLORIDE 5 MG/1
5 CAPSULE ORAL NIGHTLY
Qty: 30 CAPSULE | Refills: 1 | Status: SHIPPED | OUTPATIENT
Start: 2020-10-02

## 2020-10-02 RX ORDER — ARIPIPRAZOLE 5 MG/1
5 TABLET ORAL DAILY
Qty: 30 TABLET | Refills: 1 | Status: SHIPPED | OUTPATIENT
Start: 2020-10-03

## 2020-10-02 RX ORDER — ONDANSETRON 4 MG/1
4 TABLET, FILM COATED ORAL EVERY 8 HOURS PRN
Qty: 10 TABLET | Refills: 0
Start: 2020-10-02

## 2020-10-02 RX ORDER — DESVENLAFAXINE SUCCINATE 50 MG/1
100 TABLET, EXTENDED RELEASE ORAL NIGHTLY
Qty: 30 TABLET | Refills: 1 | Status: SHIPPED | OUTPATIENT
Start: 2020-10-02

## 2020-10-02 RX ORDER — LISINOPRIL 10 MG/1
10 TABLET ORAL
Start: 2020-10-03

## 2020-10-02 RX ADMIN — OXYCODONE AND ACETAMINOPHEN 1 TABLET: 5; 325 TABLET ORAL at 08:35

## 2020-10-02 RX ADMIN — LISINOPRIL 10 MG: 10 TABLET ORAL at 08:30

## 2020-10-02 RX ADMIN — ALPRAZOLAM 0.5 MG: 0.5 TABLET ORAL at 08:34

## 2020-10-02 RX ADMIN — ARIPIPRAZOLE 5 MG: 5 TABLET ORAL at 08:34

## 2020-10-02 RX ADMIN — DICYCLOMINE HYDROCHLORIDE 20 MG: 10 CAPSULE ORAL at 11:46

## 2020-10-02 RX ADMIN — BACLOFEN 10 MG: 10 TABLET ORAL at 08:30

## 2020-10-02 NOTE — PROGRESS NOTES
Continued Stay Note  [facility]     Patient Name: Jesusita San  MRN: [id number]  Today's Date: 10/2/2020    Admit Date: 9/23/2020    Discharge Plan     Row Name 10/02/20 1413       Plan    Final Discharge Disposition Code  01 - home or self-care    Final Note  Pt was discharged per Dr. Landry's orders.  SW met w/pt to discuss follow-up care.  Pt will follow-up w/[facility] location, ph. [phone] and has an appt via [employer] w/Macie Kramer on 10/5@9:15 am.  Once pt attends this appt, she will be set up with an appt w/Dr. Andersen at the same location for medication management.  Pt was also given a handout with [facility]BS[facility] groups.  Pt completed a safety plan and a copy was given to her at d/c.  SW faxed a copy of the d/c summary to [phone]. Pt did not have transportation home; KHALIF completed a cab voucher for pt to be transported to [address] [place] [address], [address] w/no stops, no tip.        Discharge Codes    No documentation.       Expected Discharge Date and Time     Expected Discharge Date Expected Discharge Time    Oct 2, 2020             MIRELLA Matthews

## 2020-10-02 NOTE — DISCHARGE SUMMARY
DATES OF ADMISSION: 9/23/2020-10/2/2020    REASON FOR ADMISSION: The patient is a 45-year-old white female admitted with recurrent suicidal ideation.    LABS: See chart    HOSPITAL COURSE: The patient was admitted to the crisis management unit and continued on previously prescribed Pristiq.  Abilify was added with titration to 5 mg nightly.  Because of the patient's complaints of nightmares, prazosin 5 mg at at bedtime was initiated.  The patient reported improved mood and reduction in suicidal action when seen by this physician on 10/2/2020.  She requested discharge on that date and was tolerating medications well.  As per her request discharge was ordered.    FINAL DIAGNOSIS: Major depressive disorder severe recurrent without psychotic features, chronic pain, hypertension    DISPOSITION ON DISCHARGE: I for listing the patient's medications as provided below.  She will follow up with community mental health resources.    PROGNOSIS: Fair       Discharge Medications      New Medications      Instructions Start Date   ARIPiprazole 5 MG tablet  Commonly known as: ABILIFY   5 mg, Oral, Daily   Start Date: October 3, 2020     lisinopril 10 MG tablet  Commonly known as: PRINIVIL,ZESTRIL   10 mg, Oral, Every 24 Hours Scheduled   Start Date: October 3, 2020        Changes to Medications      Instructions Start Date   ALPRAZolam 0.5 MG tablet  Commonly known as: Xanax  What changed:   · when to take this  · additional instructions   0.5 mg, Oral, 3 Times Daily PRN      ALPRAZolam 1 MG tablet  Commonly known as: XANAX  What changed: Another medication with the same name was changed. Make sure you understand how and when to take each.   1 mg, Oral, Nightly      prazosin 5 MG capsule  Commonly known as: MINIPRESS  What changed:   · medication strength  · how much to take  · Another medication with the same name was removed. Continue taking this medication, and follow the directions you see here.   5 mg, Oral, Nightly          Continue These Medications      Instructions Start Date   baclofen 10 MG tablet  Commonly known as: LIORESAL   10 mg, Oral, 3 Times Daily      famotidine 40 MG tablet  Commonly known as: PEPCID   40 mg, Oral, Nightly      ondansetron 4 MG tablet  Commonly known as: Zofran   4 mg, Oral, Every 8 Hours PRN      oxyCODONE-acetaminophen 5-325 MG per tablet  Commonly known as: PERCOCET   1 tablet, Oral, Every 8 Hours PRN         Stop These Medications    FLUoxetine 20 MG capsule  Commonly known as: PROzac     gabapentin 300 MG capsule  Commonly known as: NEURONTIN        ASK your doctor about these medications      Instructions Start Date   desvenlafaxine 50 MG 24 hr tablet  Commonly known as: Pristiq  Ask about: Which instructions should I use?   50 mg, Oral, Daily      desvenlafaxine 50 MG 24 hr tablet  Commonly known as: PRISTIQ  Ask about: Which instructions should I use?   100 mg, Oral, Nightly

## 2020-10-02 NOTE — NURSING NOTE
AVS info reviewed and copy of safety plan given to pt. Cab called for D/C. Will continue to monitor.    Left in cab no issues.

## 2020-10-02 NOTE — PLAN OF CARE
Problem: Adult Behavioral Health Plan of Care  Goal: Plan of Care Review  Outcome: Ongoing, Progressing  Flowsheets  Taken 10/2/2020 0442  Progress: improving  Outcome Summary: Patient reports continued anxiety. Patient's affect remains flat but brightens with interaction.She had appropriate interaction with peers. Patient reports waking up during the night being incontinent.  Taken 10/1/2020 2302  Plan of Care Reviewed With: patient  Patient Agreement with Plan of Care: agrees   Goal Outcome Evaluation:  Plan of Care Reviewed With: patient  Progress: improving  Outcome Summary: Patient reports continued anxiety. Patient's affect remains flat but brightens with interaction.She had appropriate interaction with peers. Patient reports waking up during the night being incontinent.

## 2020-10-06 ENCOUNTER — TELEPHONE (OUTPATIENT)
Dept: PSYCHIATRY | Facility: HOSPITAL | Age: 45
End: 2020-10-06

## (undated) DEVICE — ADHS SKIN DERMABOND TOP ADVANCED

## (undated) DEVICE — NDL HYPO ECLPS SFTY 22G 1 1/2IN

## (undated) DEVICE — SUT VIC 3/0 CTI 36IN J944H

## (undated) DEVICE — ANTIBACTERIAL UNDYED BRAIDED (POLYGLACTIN 910), SYNTHETIC ABSORBABLE SUTURE: Brand: COATED VICRYL

## (undated) DEVICE — SUT PDS 0 CT1 36IN Z346H

## (undated) DEVICE — SUT VIC 2/0 TIES 18IN J111T

## (undated) DEVICE — GLV SURG BIOGEL LTX PF 6 1/2

## (undated) DEVICE — STPLR SKIN VISISTAT WD 35CT

## (undated) DEVICE — SUT ETHIB 0/0 MO6 I8IN CX45D

## (undated) DEVICE — ELECTRD BLD EDGE/INSUL1P 2.4X5.1MM STRL

## (undated) DEVICE — PK PROC MAJ 40

## (undated) DEVICE — APPL CHLORAPREP HI/LITE 26ML ORNG

## (undated) DEVICE — INTENDED FOR TISSUE SEPARATION, AND OTHER PROCEDURES THAT REQUIRE A SHARP SURGICAL BLADE TO PUNCTURE OR CUT.: Brand: BARD-PARKER ® CARBON RIB-BACK BLADES

## (undated) DEVICE — SUT VIC 0 CT1 36IN J946H

## (undated) DEVICE — SUT VIC 3/0 TIES 18IN J110T